# Patient Record
Sex: FEMALE | Race: AMERICAN INDIAN OR ALASKA NATIVE | HISPANIC OR LATINO | URBAN - METROPOLITAN AREA
[De-identification: names, ages, dates, MRNs, and addresses within clinical notes are randomized per-mention and may not be internally consistent; named-entity substitution may affect disease eponyms.]

---

## 2018-07-03 ENCOUNTER — APPOINTMENT (RX ONLY)
Dept: URBAN - METROPOLITAN AREA CLINIC 77 | Facility: CLINIC | Age: 28
Setting detail: DERMATOLOGY
End: 2018-07-03

## 2018-07-03 DIAGNOSIS — L70.0 ACNE VULGARIS: ICD-10-CM

## 2018-07-03 DIAGNOSIS — L71.8 OTHER ROSACEA: ICD-10-CM

## 2018-07-03 DIAGNOSIS — Z79.899 OTHER LONG TERM (CURRENT) DRUG THERAPY: ICD-10-CM

## 2018-07-03 PROCEDURE — ? PRESCRIPTION

## 2018-07-03 PROCEDURE — ? COUNSELING

## 2018-07-03 PROCEDURE — ? ORDER TESTS

## 2018-07-03 PROCEDURE — 99203 OFFICE O/P NEW LOW 30 MIN: CPT

## 2018-07-03 PROCEDURE — ? TREATMENT REGIMEN

## 2018-07-03 PROCEDURE — ? DIAGNOSIS COMMENT

## 2018-07-03 PROCEDURE — ? HIGH RISK MEDICATION MONITORING

## 2018-07-03 RX ORDER — OXYMETAZOLINE HYDROCHLORIDE 10 MG/G
CREAM TOPICAL
Qty: 1 | Refills: 3 | Status: ERX | COMMUNITY
Start: 2018-07-03

## 2018-07-03 RX ORDER — SULFACETAMIDE SODIUM, SULFUR 100; 20 MG/G; MG/G
EMULSION TOPICAL
Qty: 1 | Refills: 3 | Status: ERX | COMMUNITY
Start: 2018-07-03

## 2018-07-03 RX ORDER — IVERMECTIN 10 MG/G
CREAM TOPICAL
Qty: 1 | Refills: 4 | Status: ERX | COMMUNITY
Start: 2018-07-03

## 2018-07-03 RX ORDER — DOXYCYCLINE HYCLATE 120 MG/1
TABLET, DELAYED RELEASE ORAL
Qty: 60 | Refills: 2 | Status: ERX | COMMUNITY
Start: 2018-07-03

## 2018-07-03 RX ADMIN — IVERMECTIN: 10 CREAM TOPICAL at 18:57

## 2018-07-03 RX ADMIN — DOXYCYCLINE HYCLATE: 120 TABLET, DELAYED RELEASE ORAL at 19:03

## 2018-07-03 RX ADMIN — OXYMETAZOLINE HYDROCHLORIDE: 10 CREAM TOPICAL at 18:57

## 2018-07-03 RX ADMIN — SULFACETAMIDE SODIUM, SULFUR: 100; 20 EMULSION TOPICAL at 18:57

## 2018-07-03 NOTE — PROCEDURE: ORDER TESTS
Billing Type: Third-Party Bill
Bill For Surgical Tray: no
Lab Facility: [id number]
Performing Laboratory: [id number]
Expected Date Of Service: 07/03/2018

## 2018-08-03 ENCOUNTER — APPOINTMENT (RX ONLY)
Dept: URBAN - METROPOLITAN AREA CLINIC 77 | Facility: CLINIC | Age: 28
Setting detail: DERMATOLOGY
End: 2018-08-03

## 2018-08-03 DIAGNOSIS — L71.8 OTHER ROSACEA: ICD-10-CM

## 2018-08-03 DIAGNOSIS — L70.0 ACNE VULGARIS: ICD-10-CM

## 2018-08-03 DIAGNOSIS — Z79.899 OTHER LONG TERM (CURRENT) DRUG THERAPY: ICD-10-CM

## 2018-08-03 PROCEDURE — ? HIGH RISK MEDICATION MONITORING

## 2018-08-03 PROCEDURE — ? PRESCRIPTION

## 2018-08-03 PROCEDURE — ? DIAGNOSIS COMMENT

## 2018-08-03 PROCEDURE — ? ORDER TESTS

## 2018-08-03 PROCEDURE — ? TREATMENT REGIMEN

## 2018-08-03 PROCEDURE — 99213 OFFICE O/P EST LOW 20 MIN: CPT

## 2018-08-03 PROCEDURE — ? COUNSELING

## 2018-08-03 RX ORDER — DOXYCYCLINE HYCLATE 120 MG/1
TABLET, DELAYED RELEASE ORAL
Qty: 60 | Refills: 2 | Status: CANCELLED
Stop reason: CLARIF

## 2018-08-03 RX ORDER — SULFACETAMIDE SODIUM, SULFUR 100; 20 MG/G; MG/G
EMULSION TOPICAL
Qty: 1 | Refills: 3 | Status: CANCELLED
Stop reason: CLARIF

## 2018-08-03 RX ORDER — OXYMETAZOLINE HYDROCHLORIDE 1 G/100G
CREAM TOPICAL
Qty: 1 | Refills: 3 | Status: CANCELLED
Stop reason: CLARIF

## 2018-08-03 RX ORDER — IVERMECTIN 10 MG/G
CREAM TOPICAL
Qty: 1 | Refills: 4 | Status: CANCELLED
Stop reason: CLARIF

## 2018-08-03 NOTE — PROCEDURE: ORDER TESTS
Bill For Surgical Tray: no
Billing Type: Third-Party Bill
Expected Date Of Service: 07/03/2018
Lab Facility: [id number]
Performing Laboratory: [id number]

## 2018-08-03 NOTE — PROCEDURE: TREATMENT REGIMEN
Detail Level: Zone
Plan: Location: face\\nPrescribe: Doryx po qd x 30 days\\n                  Soolantra cream top qd x 30 days\\n                  AVAR LS Cleanser top (3-5 minutes) qd x 30 days\\n\\n\\nShe has improved greatly and is very happy (no longer thinking that she might need accutane at this point)\\nI agree and think that at this point we will continue the current regimen and have her try to go down on the Doryx dosing if possible\\n\\nDiscussed with her that it is an immune mediated condition that irritates the blood vessels and oil glands-----discussed that there will be ups and downs\\nEducated her on trigger factors such as stress, spicy foods, alcohol, sunlight, etc.\\nRecommend using sunscreen with zinc oxide to help prevent trigger factors from activating\\nWill prescribe Soolantra cream to help eliminate mites that are associated with rosacea.\\nWill also prescribe AVAR LS Cleanser (3-5 minutes) to help calm down inflammation.\\nWill start taking Doryx as needed. \\n\\nF/u in 3-4 months
Plan: Location: face\\nDuration: enrolling in iPledge\\nMethod of Contraception:\\nPrescribed:\\nPharmacy: [facility]\\niPledge:\\n\\nDiscussed with patient that this is an immune mediated condition triggered with stress, lack of sleep, and also has a major genetic component\\nEducated patient on Accutane 6 month treatment course, side effects, and iPledge program/requirements (2 negative pregnancy tests 30 days apart required)\\nCommon side effects from therapy: dryness, joint pain, mood changes, headaches, nose bleeds\\nDiscussed with patient that Accutane is a Vitamin A derivative\\nDiscussed with patient that Accutane obliterates oil glands and a cure for acne vs. antibiotics which is a temporary treatment\\nDiscussed with patient that medication is processed by the liver and requires blood work to monitor liver functions\\nPt discussed that she is tired of acne and wants it gone.\\nWill enroll pt into ipledge today, discussed with pt that she has to wait 30 days until we can send in prescription.\\nWill prescribe [enter medication] today to help relieve inflammation until f/u.\\nLab slips given.\\n\\nIPLEDGE program and consent form discussed with patient. The side effects and warnings for the use of Accutane were discussed with the patient. She understands she can not donate blood for 1 month post tx and can not have laser tx, cosmetic surgery, waxing or peels for 6 months post treatment.\\n\\nDiscussed musculoskeletal SE’s in detail. Discussed HA’s, dry skin, and depression in detail. Patient denies depression. Denies personal or family H/O Crohns, IBS, IBD, or bloody stools.\\n\\nUnderstand the need to fill Rx in 6 days and have monthly labs and OV.\\nF/u in 1 month

## 2018-11-05 ENCOUNTER — APPOINTMENT (RX ONLY)
Dept: URBAN - METROPOLITAN AREA CLINIC 77 | Facility: CLINIC | Age: 28
Setting detail: DERMATOLOGY
End: 2018-11-05

## 2018-11-05 DIAGNOSIS — L71.8 OTHER ROSACEA: ICD-10-CM

## 2018-11-05 PROCEDURE — 99213 OFFICE O/P EST LOW 20 MIN: CPT

## 2018-11-05 PROCEDURE — ? COUNSELING

## 2018-11-05 PROCEDURE — ? TREATMENT REGIMEN

## 2018-11-05 NOTE — PROCEDURE: TREATMENT REGIMEN
Detail Level: Zone
Plan: Location: face\\nPrescribe: Doryx po qd x 30 days\\n                  Soolantra cream top qd x 30 days\\n                  AVAR LS Cleanser top (3-5 minutes) qd x 30 days\\n\\n\\nShe has improved greatly and is very happy (no longer thinking that she might need accutane at this point)\\nI agree and think that at this point we will continue the current regimen and have her try to go down on the Doryx dosing if possible\\n\\nDiscussed with her that it is an immune mediated condition that irritates the blood vessels and oil glands-----discussed that there will be ups and downs\\nEducated her on trigger factors such as stress, spicy foods, alcohol, sunlight, etc.\\nRecommend using sunscreen with zinc oxide to help prevent trigger factors from activating\\nWill prescribe Soolantra cream to help eliminate mites that are associated with rosacea.\\nWill also prescribe AVAR LS Cleanser (3-5 minutes) to help calm down inflammation.\\nWill start taking Doryx as needed. \\n\\nF/u in 3-4 months

## 2020-04-07 ENCOUNTER — APPOINTMENT (RX ONLY)
Dept: URBAN - METROPOLITAN AREA CLINIC 77 | Facility: CLINIC | Age: 30
Setting detail: DERMATOLOGY
End: 2020-04-07

## 2020-04-07 DIAGNOSIS — L71.8 OTHER ROSACEA: ICD-10-CM

## 2020-04-07 PROCEDURE — ? PRESCRIPTION

## 2020-04-07 PROCEDURE — ? TREATMENT REGIMEN

## 2020-04-07 PROCEDURE — ? COUNSELING

## 2020-04-07 PROCEDURE — 99213 OFFICE O/P EST LOW 20 MIN: CPT

## 2020-04-07 RX ORDER — SULFACETAMIDE SODIUM, SULFUR 98; 48 MG/G; MG/G
LIQUID TOPICAL
Qty: 1 | Refills: 3 | Status: ERX | COMMUNITY
Start: 2020-04-07

## 2020-04-07 RX ORDER — OXYMETAZOLINE HYDROCHLORIDE 1 G/100G
CREAM TOPICAL
Qty: 1 | Refills: 3 | Status: ERX

## 2020-04-07 RX ADMIN — SULFACETAMIDE SODIUM, SULFUR: 98; 48 LIQUID TOPICAL at 00:00

## 2020-04-07 ASSESSMENT — LOCATION DETAILED DESCRIPTION DERM
LOCATION DETAILED: RIGHT INFERIOR CENTRAL MALAR CHEEK
LOCATION DETAILED: LEFT INFERIOR CENTRAL MALAR CHEEK

## 2020-04-07 ASSESSMENT — LOCATION SIMPLE DESCRIPTION DERM
LOCATION SIMPLE: RIGHT CHEEK
LOCATION SIMPLE: LEFT CHEEK

## 2020-04-07 ASSESSMENT — LOCATION ZONE DERM: LOCATION ZONE: FACE

## 2020-04-07 ASSESSMENT — SEVERITY ASSESSMENT OVERALL AMONG ALL PATIENTS
IN YOUR EXPERIENCE, AMONG ALL PATIENTS YOU HAVE SEEN WITH THIS CONDITION, HOW SEVERE IS THIS PATIENT'S CONDITION?: SEVERE

## 2020-04-07 NOTE — PROCEDURE: TREATMENT REGIMEN
Detail Level: Zone
Plan: Location: face\\nPrescribe: Doryx 200mg po qd x 30 days\\n                  Rhofade cream \\n                  Plexion  Cleanser top (3-5 minutes) qd x 30 days\\nDiscontinue: Soolantra 1% cream, Doryx 120mg \\nTELEDERM\\n\\nPatient is here for a follow up for her rosacea. \\nShe was last seen in 2018 and stated that her current regimen was effective and controlled her rosacea\\nShe found that Soolantra wasn’t beneficial and decided to discontinue it\\nShe is requesting a refill on Rhofade cream, Plexion cleanser, and Doryx oral antibiotic \\nDiscussed with her that there is visible redness and erythematous pustular papules on her face today \\Soha which I educated her that this is a combination of acne and rosacea\\nIf her current regimen is ineffective, then I advised to follow up so we may consider other options such as Accutane therapy \\nToday, will increase her Doryx from 120mg to 200mg, Plexion cleanser, and Rhofade cream\\nFollow up: 6-8 weeks

## 2020-06-22 ENCOUNTER — APPOINTMENT (RX ONLY)
Dept: URBAN - METROPOLITAN AREA CLINIC 77 | Facility: CLINIC | Age: 30
Setting detail: DERMATOLOGY
End: 2020-06-22

## 2020-06-22 DIAGNOSIS — L70.0 ACNE VULGARIS: ICD-10-CM

## 2020-06-22 DIAGNOSIS — L71.8 OTHER ROSACEA: ICD-10-CM

## 2020-06-22 DIAGNOSIS — Z79.899 OTHER LONG TERM (CURRENT) DRUG THERAPY: ICD-10-CM

## 2020-06-22 PROCEDURE — ? PRESCRIPTION

## 2020-06-22 PROCEDURE — ? URINE PREGNANCY TEST

## 2020-06-22 PROCEDURE — ? TREATMENT REGIMEN

## 2020-06-22 PROCEDURE — 99214 OFFICE O/P EST MOD 30 MIN: CPT

## 2020-06-22 PROCEDURE — ? HIGH RISK MEDICATION MONITORING

## 2020-06-22 PROCEDURE — ? ORDER TESTS

## 2020-06-22 PROCEDURE — 81025 URINE PREGNANCY TEST: CPT

## 2020-06-22 PROCEDURE — ? COUNSELING

## 2020-06-22 RX ORDER — OXYMETAZOLINE HYDROCHLORIDE 1 G/100G
CREAM TOPICAL
Qty: 1 | Refills: 3 | Status: ERX

## 2020-06-22 RX ORDER — SPIRONOLACTONE 100 MG/1
TABLET, FILM COATED ORAL QD
Qty: 30 | Refills: 1 | Status: ERX | COMMUNITY
Start: 2020-06-22

## 2020-06-22 RX ORDER — SULFACETAMIDE SODIUM, SULFUR 98; 48 MG/G; MG/G
LIQUID TOPICAL
Qty: 1 | Refills: 3 | Status: ERX

## 2020-06-22 RX ADMIN — SPIRONOLACTONE: 100 TABLET, FILM COATED ORAL at 00:00

## 2020-06-22 ASSESSMENT — LOCATION SIMPLE DESCRIPTION DERM
LOCATION SIMPLE: LEFT CHEEK
LOCATION SIMPLE: RIGHT CHEEK

## 2020-06-22 ASSESSMENT — LOCATION ZONE DERM: LOCATION ZONE: FACE

## 2020-06-22 ASSESSMENT — LOCATION DETAILED DESCRIPTION DERM
LOCATION DETAILED: LEFT INFERIOR CENTRAL MALAR CHEEK
LOCATION DETAILED: RIGHT INFERIOR CENTRAL MALAR CHEEK

## 2020-06-22 NOTE — PROCEDURE: COUNSELING
Detail Level: Detailed
Topical Retinoid counseling:  Patient advised to apply a pea-sized amount only at bedtime and wait 30 minutes after washing their face before applying.  If too drying, patient may add a non-comedogenic moisturizer. The patient verbalized understanding of the proper use and possible adverse effects of retinoids.  All of the patient's questions and concerns were addressed.
Erythromycin Pregnancy And Lactation Text: This medication is Pregnancy Category B and is considered safe during pregnancy. It is also excreted in breast milk.
Topical Sulfur Applications Counseling: Topical Sulfur Counseling: Patient counseled that this medication may cause skin irritation or allergic reactions.  In the event of skin irritation, the patient was advised to reduce the amount of the drug applied or use it less frequently.   The patient verbalized understanding of the proper use and possible adverse effects of topical sulfur application.  All of the patient's questions and concerns were addressed.
Spironolactone Counseling: Patient advised regarding risks of diarrhea, abdominal pain, hyperkalemia, birth defects (for female patients), liver toxicity and renal toxicity. The patient may need blood work to monitor liver and kidney function and potassium levels while on therapy. The patient verbalized understanding of the proper use and possible adverse effects of spironolactone.  All of the patient's questions and concerns were addressed.
Bactrim Pregnancy And Lactation Text: This medication is Pregnancy Category D and is known to cause fetal risk.  It is also excreted in breast milk.
Bactrim Counseling:  I discussed with the patient the risks of sulfa antibiotics including but not limited to GI upset, allergic reaction, drug rash, diarrhea, dizziness, photosensitivity, and yeast infections.  Rarely, more serious reactions can occur including but not limited to aplastic anemia, agranulocytosis, methemoglobinemia, blood dyscrasias, liver or kidney failure, lung infiltrates or desquamative/blistering drug rashes.
Isotretinoin Counseling: Patient should get monthly blood tests, not donate blood, not drive at night if vision affected, not share medication, and not undergo elective surgery for 6 months after tx completed. Side effects reviewed, pt to contact office should one occur.
Isotretinoin Pregnancy And Lactation Text: This medication is Pregnancy Category X and is considered extremely dangerous during pregnancy. It is unknown if it is excreted in breast milk.
Benzoyl Peroxide Pregnancy And Lactation Text: This medication is Pregnancy Category C. It is unknown if benzoyl peroxide is excreted in breast milk.
Birth Control Pills Counseling: Birth Control Pill Counseling: I discussed with the patient the potential side effects of OCPs including but not limited to increased risk of stroke, heart attack, thrombophlebitis, deep venous thrombosis, hepatic adenomas, breast changes, GI upset, headaches, and depression.  The patient verbalized understanding of the proper use and possible adverse effects of OCPs. All of the patient's questions and concerns were addressed.
Topical Clindamycin Counseling: Patient counseled that this medication may cause skin irritation or allergic reactions.  In the event of skin irritation, the patient was advised to reduce the amount of the drug applied or use it less frequently.   The patient verbalized understanding of the proper use and possible adverse effects of clindamycin.  All of the patient's questions and concerns were addressed.
Use Enhanced Medication Counseling?: No
Birth Control Pills Pregnancy And Lactation Text: This medication should be avoided if pregnant and for the first 30 days post-partum.
Azithromycin Counseling:  I discussed with the patient the risks of azithromycin including but not limited to GI upset, allergic reaction, drug rash, diarrhea, and yeast infections.
Dapsone Counseling: I discussed with the patient the risks of dapsone including but not limited to hemolytic anemia, agranulocytosis, rashes, methemoglobinemia, kidney failure, peripheral neuropathy, headaches, GI upset, and liver toxicity.  Patients who start dapsone require monitoring including baseline LFTs and weekly CBCs for the first month, then every month thereafter.  The patient verbalized understanding of the proper use and possible adverse effects of dapsone.  All of the patient's questions and concerns were addressed.
Erythromycin Counseling:  I discussed with the patient the risks of erythromycin including but not limited to GI upset, allergic reaction, drug rash, diarrhea, increase in liver enzymes, and yeast infections.
Tazorac Pregnancy And Lactation Text: This medication is not safe during pregnancy. It is unknown if this medication is excreted in breast milk.
Benzoyl Peroxide Counseling: Patient counseled that medicine may cause skin irritation and bleach clothing.  In the event of skin irritation, the patient was advised to reduce the amount of the drug applied or use it less frequently.   The patient verbalized understanding of the proper use and possible adverse effects of benzoyl peroxide.  All of the patient's questions and concerns were addressed.
Doxycycline Counseling:  Patient counseled regarding possible photosensitivity and increased risk for sunburn.  Patient instructed to avoid sunlight, if possible.  When exposed to sunlight, patients should wear protective clothing, sunglasses, and sunscreen.  The patient was instructed to call the office immediately if the following severe adverse effects occur:  hearing changes, easy bruising/bleeding, severe headache, or vision changes.  The patient verbalized understanding of the proper use and possible adverse effects of doxycycline.  All of the patient's questions and concerns were addressed.
High Dose Vitamin A Pregnancy And Lactation Text: High dose vitamin A therapy is contraindicated during pregnancy and breast feeding.
Topical Clindamycin Pregnancy And Lactation Text: This medication is Pregnancy Category B and is considered safe during pregnancy. It is unknown if it is excreted in breast milk.
Doxycycline Pregnancy And Lactation Text: This medication is Pregnancy Category D and not consider safe during pregnancy. It is also excreted in breast milk but is considered safe for shorter treatment courses.
Tetracycline Pregnancy And Lactation Text: This medication is Pregnancy Category D and not consider safe during pregnancy. It is also excreted in breast milk.
Minocycline Counseling: Patient advised regarding possible photosensitivity and discoloration of the teeth, skin, lips, tongue and gums.  Patient instructed to avoid sunlight, if possible.  When exposed to sunlight, patients should wear protective clothing, sunglasses, and sunscreen.  The patient was instructed to call the office immediately if the following severe adverse effects occur:  hearing changes, easy bruising/bleeding, severe headache, or vision changes.  The patient verbalized understanding of the proper use and possible adverse effects of minocycline.  All of the patient's questions and concerns were addressed.
High Dose Vitamin A Counseling: Side effects reviewed, pt to contact office should one occur.
Detail Level: Zone
Topical Sulfur Applications Pregnancy And Lactation Text: This medication is Pregnancy Category C and has an unknown safety profile during pregnancy. It is unknown if this topical medication is excreted in breast milk.
Dapsone Pregnancy And Lactation Text: This medication is Pregnancy Category C and is not considered safe during pregnancy or breast feeding.
Tazorac Counseling:  Patient advised that medication is irritating and drying.  Patient may need to apply sparingly and wash off after an hour before eventually leaving it on overnight.  The patient verbalized understanding of the proper use and possible adverse effects of tazorac.  All of the patient's questions and concerns were addressed.
Spironolactone Pregnancy And Lactation Text: This medication can cause feminization of the male fetus and should be avoided during pregnancy. The active metabolite is also found in breast milk.
Azithromycin Pregnancy And Lactation Text: This medication is considered safe during pregnancy and is also secreted in breast milk.
Topical Retinoid Pregnancy And Lactation Text: This medication is Pregnancy Category C. It is unknown if this medication is excreted in breast milk.
Tetracycline Counseling: Patient counseled regarding possible photosensitivity and increased risk for sunburn.  Patient instructed to avoid sunlight, if possible.  When exposed to sunlight, patients should wear protective clothing, sunglasses, and sunscreen.  The patient was instructed to call the office immediately if the following severe adverse effects occur:  hearing changes, easy bruising/bleeding, severe headache, or vision changes.  The patient verbalized understanding of the proper use and possible adverse effects of tetracycline.  All of the patient's questions and concerns were addressed. Patient understands to avoid pregnancy while on therapy due to potential birth defects.

## 2020-06-22 NOTE — PROCEDURE: HIGH RISK MEDICATION MONITORING
Infliximab Counseling:  I discussed with the patient the risks of infliximab including but not limited to myelosuppression, immunosuppression, autoimmune hepatitis, demyelinating diseases, lymphoma, and serious infections.  The patient understands that monitoring is required including a PPD at baseline and must alert us or the primary physician if symptoms of infection or other concerning signs are noted.
Siliq Counseling:  I discussed with the patient the risks of Siliq including but not limited to new or worsening depression, suicidal thoughts and behavior, immunosuppression, malignancy, posterior leukoencephalopathy syndrome, and serious infections.  The patient understands that monitoring is required including a PPD at baseline and must alert us or the primary physician if symptoms of infection or other concerning signs are noted. There is also a special program designed to monitor depression which is required with Siliq.
Ilumya Pregnancy And Lactation Text: The risk during pregnancy and breastfeeding is uncertain with this medication.
Arava Counseling:  Patient counseled regarding adverse effects of Arava including but not limited to nausea, vomiting, abnormalities in liver function tests. Patients may develop mouth sores, rash, diarrhea, and abnormalities in blood counts. The patient understands that monitoring is required including LFTs and blood counts.  There is a rare possibility of scarring of the liver and lung problems that can occur when taking methotrexate. Persistent nausea, loss of appetite, pale stools, dark urine, cough, and shortness of breath should be reported immediately. Patient advised to discontinue Arava treatment and consult with a physician prior to attempting conception. The patient will have to undergo a treatment to eliminate Arava from the body prior to conception.
Prednisone Pregnancy And Lactation Text: This medication is Pregnancy Category C and it isn't know if it is safe during pregnancy. This medication is excreted in breast milk.
Xolair Counseling:  Patient informed of potential adverse effects including but not limited to fever, muscle aches, rash and allergic reactions.  The patient verbalized understanding of the proper use and possible adverse effects of Xolair.  All of the patient's questions and concerns were addressed.
Enbrel Counseling:  I discussed with the patient the risks of etanercept including but not limited to myelosuppression, immunosuppression, autoimmune hepatitis, demyelinating diseases, lymphoma, and infections.  The patient understands that monitoring is required including a PPD at baseline and must alert us or the primary physician if symptoms of infection or other concerning signs are noted.
Cyclophosphamide Pregnancy And Lactation Text: This medication is Pregnancy Category D and it isn't considered safe during pregnancy. This medication is excreted in breast milk.
Elidel Counseling: Patient may experience a mild burning sensation during topical application. Elidel is not approved in children less than 2 years of age. There have been case reports of hematologic and skin malignancies in patients using topical calcineurin inhibitors although causality is questionable.
Detail Level: Generalized
Topical Retinoid Pregnancy And Lactation Text: This medication is Pregnancy Category C. It is unknown if this medication is excreted in breast milk.
Terbinafine Counseling: Patient counseling regarding adverse effects of terbinafine including but not limited to headache, diarrhea, rash, upset stomach, liver function test abnormalities, itching, taste/smell disturbance, nausea, abdominal pain, and flatulence.  There is a rare possibility of liver failure that can occur when taking terbinafine.  The patient understands that a baseline LFT and kidney function test may be required. The patient verbalized understanding of the proper use and possible adverse effects of terbinafine.  All of the patient's questions and concerns were addressed.
Bexarotene Counseling:  I discussed with the patient the risks of bexarotene including but not limited to hair loss, dry lips/skin/eyes, liver abnormalities, hyperlipidemia, pancreatitis, depression/suicidal ideation, photosensitivity, drug rash/allergic reactions, hypothyroidism, anemia, leukopenia, infection, cataracts, and teratogenicity.  Patient understands that they will need regular blood tests to check lipid profile, liver function tests, white blood cell count, thyroid function tests and pregnancy test if applicable.
Hydroxyzine Pregnancy And Lactation Text: This medication is not safe during pregnancy and should not be taken. It is also excreted in breast milk and breast feeding isn't recommended.
Cellcept Counseling:  I discussed with the patient the risks of mycophenolate mofetil including but not limited to infection/immunosuppression, GI upset, hypokalemia, hypercholesterolemia, bone marrow suppression, lymphoproliferative disorders, malignancy, GI ulceration/bleed/perforation, colitis, interstitial lung disease, kidney failure, progressive multifocal leukoencephalopathy, and birth defects.  The patient understands that monitoring is required including a baseline creatinine and regular CBC testing. In addition, patient must alert us immediately if symptoms of infection or other concerning signs are noted.
Cosentyx Counseling:  I discussed with the patient the risks of Cosentyx including but not limited to worsening of Crohn's disease, immunosuppression, allergic reactions and infections.  The patient understands that monitoring is required including a PPD at baseline and must alert us or the primary physician if symptoms of infection or other concerning signs are noted.
Hydroquinone Pregnancy And Lactation Text: This medication has not been assigned a Pregnancy Risk Category but animal studies failed to show danger with the topical medication. It is unknown if the medication is excreted in breast milk.
Metronidazole Counseling:  I discussed with the patient the risks of metronidazole including but not limited to seizures, nausea/vomiting, a metallic taste in the mouth, nausea/vomiting and severe allergy.
Albendazole Counseling:  I discussed with the patient the risks of albendazole including but not limited to cytopenia, kidney damage, nausea/vomiting and severe allergy.  The patient understands that this medication is being used in an off-label manner.
SSKI Counseling:  I discussed with the patient the risks of SSKI including but not limited to thyroid abnormalities, metallic taste, GI upset, fever, headache, acne, arthralgias, paraesthesias, lymphadenopathy, easy bleeding, arrhythmias, and allergic reaction.
Bactrim Pregnancy And Lactation Text: This medication is Pregnancy Category D and is known to cause fetal risk.  It is also excreted in breast milk.
Clindamycin Counseling: I counseled the patient regarding use of clindamycin as an antibiotic for prophylactic and/or therapeutic purposes. Clindamycin is active against numerous classes of bacteria, including skin bacteria. Side effects may include nausea, diarrhea, gastrointestinal upset, rash, hives, yeast infections, and in rare cases, colitis.
Quinolones Counseling:  I discussed with the patient the risks of fluoroquinolones including but not limited to GI upset, allergic reaction, drug rash, diarrhea, dizziness, photosensitivity, yeast infections, liver function test abnormalities, tendonitis/tendon rupture.
Use Enhanced Medication Counseling?: No
Birth Control Pills Counseling: Birth Control Pill Counseling: I discussed with the patient the potential side effects of OCPs including but not limited to increased risk of stroke, heart attack, thrombophlebitis, deep venous thrombosis, hepatic adenomas, breast changes, GI upset, headaches, and depression.  The patient verbalized understanding of the proper use and possible adverse effects of OCPs. All of the patient's questions and concerns were addressed.
Erythromycin Pregnancy And Lactation Text: This medication is Pregnancy Category B and is considered safe during pregnancy. It is also excreted in breast milk.
Hydroquinone Counseling:  Patient advised that medication may result in skin irritation, lightening (hypopigmentation), dryness, and burning.  In the event of skin irritation, the patient was advised to reduce the amount of the drug applied or use it less frequently.  Rarely, spots that are treated with hydroquinone can become darker (pseudoochronosis).  Should this occur, patient instructed to stop medication and call the office. The patient verbalized understanding of the proper use and possible adverse effects of hydroquinone.  All of the patient's questions and concerns were addressed.
Fluconazole Counseling:  Patient counseled regarding adverse effects of fluconazole including but not limited to headache, diarrhea, nausea, upset stomach, liver function test abnormalities, taste disturbance, and stomach pain.  There is a rare possibility of liver failure that can occur when taking fluconazole.  The patient understands that monitoring of LFTs and kidney function test may be required, especially at baseline. The patient verbalized understanding of the proper use and possible adverse effects of fluconazole.  All of the patient's questions and concerns were addressed.
Minoxidil Counseling: Minoxidil is a topical medication which can increase blood flow where it is applied. It is uncertain how this medication increases hair growth. Side effects are uncommon and include stinging and allergic reactions.
Infliximab Pregnancy And Lactation Text: This medication is Pregnancy Category B and is considered safe during pregnancy. It is unknown if this medication is excreted in breast milk.
Valtrex Pregnancy And Lactation Text: this medication is Pregnancy Category B and is considered safe during pregnancy. This medication is not directly found in breast milk but it's metabolite acyclovir is present.
Acitretin Pregnancy And Lactation Text: This medication is Pregnancy Category X and should not be given to women who are pregnant or may become pregnant in the future. This medication is excreted in breast milk.
Sski Pregnancy And Lactation Text: This medication is Pregnancy Category D and isn't considered safe during pregnancy. It is excreted in breast milk.
Isotretinoin Pregnancy And Lactation Text: This medication is Pregnancy Category X and is considered extremely dangerous during pregnancy. It is unknown if it is excreted in breast milk.
Odomzo Counseling- I discussed with the patient the risks of Odomzo including but not limited to nausea, vomiting, diarrhea, constipation, weight loss, changes in the sense of taste, decreased appetite, muscle spasms, and hair loss.  The patient verbalized understanding of the proper use and possible adverse effects of Odomzo.  All of the patient's questions and concerns were addressed.
Drysol Counseling:  I discussed with the patient the risks of drysol/aluminum chloride including but not limited to skin rash, itching, irritation, burning.
Oxybutynin Counseling:  I discussed with the patient the risks of oxybutynin including but not limited to skin rash, drowsiness, dry mouth, difficulty urinating, and blurred vision.
Drysol Pregnancy And Lactation Text: This medication is considered safe during pregnancy and breast feeding.
Cephalexin Pregnancy And Lactation Text: This medication is Pregnancy Category B and considered safe during pregnancy.  It is also excreted in breast milk but can be used safely for shorter doses.
Fluconazole Pregnancy And Lactation Text: This medication is Pregnancy Category C and it isn't know if it is safe during pregnancy. It is also excreted in breast milk.
5-Fu Pregnancy And Lactation Text: This medication is Pregnancy Category X and contraindicated in pregnancy and in women who may become pregnant. It is unknown if this medication is excreted in breast milk.
Spironolactone Counseling: Patient advised regarding risks of diarrhea, abdominal pain, hyperkalemia, birth defects (for female patients), liver toxicity and renal toxicity. The patient may need blood work to monitor liver and kidney function and potassium levels while on therapy. The patient verbalized understanding of the proper use and possible adverse effects of spironolactone.  All of the patient's questions and concerns were addressed.
Otezla Counseling: The side effects of Otezla were discussed with the patient, including but not limited to worsening or new depression, weight loss, diarrhea, nausea, upper respiratory tract infection, and headache. Patient instructed to call the office should any adverse effect occur.  The patient verbalized understanding of the proper use and possible adverse effects of Otezla.  All the patient's questions and concerns were addressed.
Cimzia Pregnancy And Lactation Text: This medication crosses the placenta but can be considered safe in certain situations. Cimzia may be excreted in breast milk.
Gabapentin Counseling: I discussed with the patient the risks of gabapentin including but not limited to dizziness, somnolence, fatigue and ataxia.
Solaraze Pregnancy And Lactation Text: This medication is Pregnancy Category B and is considered safe. There is some data to suggest avoiding during the third trimester. It is unknown if this medication is excreted in breast milk.
Isotretinoin Counseling: Patient should get monthly blood tests, not donate blood, not drive at night if vision affected, not share medication, and not undergo elective surgery for 6 months after tx completed. Side effects reviewed, pt to contact office should one occur.
Cephalexin Counseling: I counseled the patient regarding use of cephalexin as an antibiotic for prophylactic and/or therapeutic purposes. Cephalexin (commonly prescribed under brand name Keflex) is a cephalosporin antibiotic which is active against numerous classes of bacteria, including most skin bacteria. Side effects may include nausea, diarrhea, gastrointestinal upset, rash, hives, yeast infections, and in rare cases, hepatitis, kidney disease, seizures, fever, confusion, neurologic symptoms, and others. Patients with severe allergies to penicillin medications are cautioned that there is about a 10% incidence of cross-reactivity with cephalosporins. When possible, patients with penicillin allergies should use alternatives to cephalosporins for antibiotic therapy.
Dupixent Pregnancy And Lactation Text: This medication likely crosses the placenta but the risk for the fetus is uncertain. This medication is excreted in breast milk.
Griseofulvin Counseling:  I discussed with the patient the risks of griseofulvin including but not limited to photosensitivity, cytopenia, liver damage, nausea/vomiting and severe allergy.  The patient understands that this medication is best absorbed when taken with a fatty meal (e.g., ice cream or french fries).
Methotrexate Pregnancy And Lactation Text: This medication is Pregnancy Category X and is known to cause fetal harm. This medication is excreted in breast milk.
Prednisone Counseling:  I discussed with the patient the risks of prolonged use of prednisone including but not limited to weight gain, insomnia, osteoporosis, mood changes, diabetes, susceptibility to infection, glaucoma and high blood pressure.  In cases where prednisone use is prolonged, patients should be monitored with blood pressure checks, serum glucose levels and an eye exam.  Additionally, the patient may need to be placed on GI prophylaxis, PCP prophylaxis, and calcium and vitamin D supplementation and/or a bisphosphonate.  The patient verbalized understanding of the proper use and the possible adverse effects of prednisone.  All of the patient's questions and concerns were addressed.
Itraconazole Counseling:  I discussed with the patient the risks of itraconazole including but not limited to liver damage, nausea/vomiting, neuropathy, and severe allergy.  The patient understands that this medication is best absorbed when taken with acidic beverages such as non-diet cola or ginger ale.  The patient understands that monitoring is required including baseline LFTs and repeat LFTs at intervals.  The patient understands that they are to contact us or the primary physician if concerning signs are noted.
Xolair Pregnancy And Lactation Text: This medication is Pregnancy Category B and is considered safe during pregnancy. This medication is excreted in breast milk.
Xeljanz Counseling: I discussed with the patient the risks of Xeljanz therapy including increased risk of infection, liver issues, headache, diarrhea, or cold symptoms. Live vaccines should be avoided. They were instructed to call if they have any problems.
Clofazimine Counseling:  I discussed with the patient the risks of clofazimine including but not limited to skin and eye pigmentation, liver damage, nausea/vomiting, gastrointestinal bleeding and allergy.
Erythromycin Counseling:  I discussed with the patient the risks of erythromycin including but not limited to GI upset, allergic reaction, drug rash, diarrhea, increase in liver enzymes, and yeast infections.
Dapsone Pregnancy And Lactation Text: This medication is Pregnancy Category C and is not considered safe during pregnancy or breast feeding.
Dupixent Counseling: I discussed with the patient the risks of dupilumab including but not limited to eye infection and irritation, cold sores, injection site reactions, worsening of asthma, allergic reactions and increased risk of parasitic infection.  Live vaccines should be avoided while taking dupilumab. Dupilumab will also interact with certain medications such as warfarin and cyclosporine. The patient understands that monitoring is required and they must alert us or the primary physician if symptoms of infection or other concerning signs are noted.
Protopic Counseling: Patient may experience a mild burning sensation during topical application. Protopic is not approved in children less than 2 years of age. There have been case reports of hematologic and skin malignancies in patients using topical calcineurin inhibitors although causality is questionable.
Colchicine Counseling:  Patient counseled regarding adverse effects including but not limited to stomach upset (nausea, vomiting, stomach pain, or diarrhea).  Patient instructed to limit alcohol consumption while taking this medication.  Colchicine may reduce blood counts especially with prolonged use.  The patient understands that monitoring of kidney function and blood counts may be required, especially at baseline. The patient verbalized understanding of the proper use and possible adverse effects of colchicine.  All of the patient's questions and concerns were addressed.
Imiquimod Counseling:  I discussed with the patient the risks of imiquimod including but not limited to erythema, scaling, itching, weeping, crusting, and pain.  Patient understands that the inflammatory response to imiquimod is variable from person to person and was educated regarded proper titration schedule.  If flu-like symptoms develop, patient knows to discontinue the medication and contact us.
Thalidomide Counseling: I discussed with the patient the risks of thalidomide including but not limited to birth defects, anxiety, weakness, chest pain, dizziness, cough and severe allergy.
Nsaids Counseling: NSAID Counseling: I discussed with the patient that NSAIDs should be taken with food. Prolonged use of NSAIDs can result in the development of stomach ulcers.  Patient advised to stop taking NSAIDs if abdominal pain occurs.  The patient verbalized understanding of the proper use and possible adverse effects of NSAIDs.  All of the patient's questions and concerns were addressed.
Minocycline Pregnancy And Lactation Text: This medication is Pregnancy Category D and not consider safe during pregnancy. It is also excreted in breast milk.
Terbinafine Pregnancy And Lactation Text: This medication is Pregnancy Category B and is considered safe during pregnancy. It is also excreted in breast milk and breast feeding isn't recommended.
Cyclophosphamide Counseling:  I discussed with the patient the risks of cyclophosphamide including but not limited to hair loss, hormonal abnormalities, decreased fertility, abdominal pain, diarrhea, nausea and vomiting, bone marrow suppression and infection. The patient understands that monitoring is required while taking this medication.
Ivermectin Pregnancy And Lactation Text: This medication is Pregnancy Category C and it isn't known if it is safe during pregnancy. It is also excreted in breast milk.
Tremfya Counseling: I discussed with the patient the risks of guselkumab including but not limited to immunosuppression, serious infections, worsening of inflammatory bowel disease and drug reactions.  The patient understands that monitoring is required including a PPD at baseline and must alert us or the primary physician if symptoms of infection or other concerning signs are noted.
Spironolactone Pregnancy And Lactation Text: This medication can cause feminization of the male fetus and should be avoided during pregnancy. The active metabolite is also found in breast milk.
Tetracycline Counseling: Patient counseled regarding possible photosensitivity and increased risk for sunburn.  Patient instructed to avoid sunlight, if possible.  When exposed to sunlight, patients should wear protective clothing, sunglasses, and sunscreen.  The patient was instructed to call the office immediately if the following severe adverse effects occur:  hearing changes, easy bruising/bleeding, severe headache, or vision changes.  The patient verbalized understanding of the proper use and possible adverse effects of tetracycline.  All of the patient's questions and concerns were addressed. Patient understands to avoid pregnancy while on therapy due to potential birth defects.
Doxepin Pregnancy And Lactation Text: This medication is Pregnancy Category C and it isn't known if it is safe during pregnancy. It is also excreted in breast milk and breast feeding isn't recommended.
Clofazimine Pregnancy And Lactation Text: This medication is Pregnancy Category C and isn't considered safe during pregnancy. It is excreted in breast milk.
Doxepin Counseling:  Patient advised that the medication is sedating and not to drive a car after taking this medication. Patient informed of potential adverse effects including but not limited to dry mouth, urinary retention, and blurry vision.  The patient verbalized understanding of the proper use and possible adverse effects of doxepin.  All of the patient's questions and concerns were addressed.
High Dose Vitamin A Pregnancy And Lactation Text: High dose vitamin A therapy is contraindicated during pregnancy and breast feeding.
Methotrexate Counseling:  Patient counseled regarding adverse effects of methotrexate including but not limited to nausea, vomiting, abnormalities in liver function tests. Patients may develop mouth sores, rash, diarrhea, and abnormalities in blood counts. The patient understands that monitoring is required including LFT's and blood counts.  There is a rare possibility of scarring of the liver and lung problems that can occur when taking methotrexate. Persistent nausea, loss of appetite, pale stools, dark urine, cough, and shortness of breath should be reported immediately. Patient advised to discontinue methotrexate treatment at least three months before attempting to become pregnant.  I discussed the need for folate supplements while taking methotrexate.  These supplements can decrease side effects during methotrexate treatment. The patient verbalized understanding of the proper use and possible adverse effects of methotrexate.  All of the patient's questions and concerns were addressed.
Benzoyl Peroxide Counseling: Patient counseled that medicine may cause skin irritation and bleach clothing.  In the event of skin irritation, the patient was advised to reduce the amount of the drug applied or use it less frequently.   The patient verbalized understanding of the proper use and possible adverse effects of benzoyl peroxide.  All of the patient's questions and concerns were addressed.
Topical Clindamycin Pregnancy And Lactation Text: This medication is Pregnancy Category B and is considered safe during pregnancy. It is unknown if it is excreted in breast milk.
Cellcept Pregnancy And Lactation Text: This medication is Pregnancy Category D and isn't considered safe during pregnancy. It is unknown if this medication is excreted in breast milk.
Rifampin Pregnancy And Lactation Text: This medication is Pregnancy Category C and it isn't know if it is safe during pregnancy. It is also excreted in breast milk and should not be used if you are breast feeding.
Erivedge Pregnancy And Lactation Text: This medication is Pregnancy Category X and is absolutely contraindicated during pregnancy. It is unknown if it is excreted in breast milk.
Bactrim Counseling:  I discussed with the patient the risks of sulfa antibiotics including but not limited to GI upset, allergic reaction, drug rash, diarrhea, dizziness, photosensitivity, and yeast infections.  Rarely, more serious reactions can occur including but not limited to aplastic anemia, agranulocytosis, methemoglobinemia, blood dyscrasias, liver or kidney failure, lung infiltrates or desquamative/blistering drug rashes.
Ivermectin Counseling:  Patient instructed to take medication on an empty stomach with a full glass of water.  Patient informed of potential adverse effects including but not limited to nausea, diarrhea, dizziness, itching, and swelling of the extremities or lymph nodes.  The patient verbalized understanding of the proper use and possible adverse effects of ivermectin.  All of the patient's questions and concerns were addressed.
Valtrex Counseling: I discussed with the patient the risks of valacyclovir including but not limited to kidney damage, nausea, vomiting and severe allergy.  The patient understands that if the infection seems to be worsening or is not improving, they are to call.
Eucrisa Counseling: Patient may experience a mild burning sensation during topical application. Eucrisa is not approved in children less than 2 years of age.
Carac Counseling:  I discussed with the patient the risks of Carac including but not limited to erythema, scaling, itching, weeping, crusting, and pain.
Picato Counseling:  I discussed with the patient the risks of Picato including but not limited to erythema, scaling, itching, weeping, crusting, and pain.
Glycopyrrolate Counseling:  I discussed with the patient the risks of glycopyrrolate including but not limited to skin rash, drowsiness, dry mouth, difficulty urinating, and blurred vision.
High Dose Vitamin A Counseling: Side effects reviewed, pt to contact office should one occur.
Taltz Counseling: I discussed with the patient the risks of ixekizumab including but not limited to immunosuppression, serious infections, worsening of inflammatory bowel disease and drug reactions.  The patient understands that monitoring is required including a PPD at baseline and must alert us or the primary physician if symptoms of infection or other concerning signs are noted.
Ilumya Counseling: I discussed with the patient the risks of tildrakizumab including but not limited to immunosuppression, malignancy, posterior leukoencephalopathy syndrome, and serious infections.  The patient understands that monitoring is required including a PPD at baseline and must alert us or the primary physician if symptoms of infection or other concerning signs are noted.
Humira Counseling:  I discussed with the patient the risks of adalimumab including but not limited to myelosuppression, immunosuppression, autoimmune hepatitis, demyelinating diseases, lymphoma, and serious infections.  The patient understands that monitoring is required including a PPD at baseline and must alert us or the primary physician if symptoms of infection or other concerning signs are noted.
Benzoyl Peroxide Pregnancy And Lactation Text: This medication is Pregnancy Category C. It is unknown if benzoyl peroxide is excreted in breast milk.
Rifampin Counseling: I discussed with the patient the risks of rifampin including but not limited to liver damage, kidney damage, red-orange body fluids, nausea/vomiting and severe allergy.
Protopic Pregnancy And Lactation Text: This medication is Pregnancy Category C. It is unknown if this medication is excreted in breast milk when applied topically.
Ketoconazole Pregnancy And Lactation Text: This medication is Pregnancy Category C and it isn't know if it is safe during pregnancy. It is also excreted in breast milk and breast feeding isn't recommended.
Otezla Pregnancy And Lactation Text: This medication is Pregnancy Category C and it isn't known if it is safe during pregnancy. It is unknown if it is excreted in breast milk.
Topical Sulfur Applications Counseling: Topical Sulfur Counseling: Patient counseled that this medication may cause skin irritation or allergic reactions.  In the event of skin irritation, the patient was advised to reduce the amount of the drug applied or use it less frequently.   The patient verbalized understanding of the proper use and possible adverse effects of topical sulfur application.  All of the patient's questions and concerns were addressed.
Tazorac Pregnancy And Lactation Text: This medication is not safe during pregnancy. It is unknown if this medication is excreted in breast milk.
5-Fu Counseling: 5-Fluorouracil Counseling:  I discussed with the patient the risks of 5-fluorouracil including but not limited to erythema, scaling, itching, weeping, crusting, and pain.
Solaraze Counseling:  I discussed with the patient the risks of Solaraze including but not limited to erythema, scaling, itching, weeping, crusting, and pain.
Griseofulvin Pregnancy And Lactation Text: This medication is Pregnancy Category X and is known to cause serious birth defects. It is unknown if this medication is excreted in breast milk but breast feeding should be avoided.
Simponi Counseling:  I discussed with the patient the risks of golimumab including but not limited to myelosuppression, immunosuppression, autoimmune hepatitis, demyelinating diseases, lymphoma, and serious infections.  The patient understands that monitoring is required including a PPD at baseline and must alert us or the primary physician if symptoms of infection or other concerning signs are noted.
Zyclara Counseling:  I discussed with the patient the risks of imiquimod including but not limited to erythema, scaling, itching, weeping, crusting, and pain.  Patient understands that the inflammatory response to imiquimod is variable from person to person and was educated regarded proper titration schedule.  If flu-like symptoms develop, patient knows to discontinue the medication and contact us.
Cimetidine Counseling:  I discussed with the patient the risks of Cimetidine including but not limited to gynecomastia, headache, diarrhea, nausea, drowsiness, arrhythmias, pancreatitis, skin rashes, psychosis, bone marrow suppression and kidney toxicity.
Metronidazole Pregnancy And Lactation Text: This medication is Pregnancy Category B and considered safe during pregnancy.  It is also excreted in breast milk.
Doxycycline Counseling:  Patient counseled regarding possible photosensitivity and increased risk for sunburn.  Patient instructed to avoid sunlight, if possible.  When exposed to sunlight, patients should wear protective clothing, sunglasses, and sunscreen.  The patient was instructed to call the office immediately if the following severe adverse effects occur:  hearing changes, easy bruising/bleeding, severe headache, or vision changes.  The patient verbalized understanding of the proper use and possible adverse effects of doxycycline.  All of the patient's questions and concerns were addressed.
Hydroxychloroquine Pregnancy And Lactation Text: This medication has been shown to cause fetal harm but it isn't assigned a Pregnancy Risk Category. There are small amounts excreted in breast milk.
Topical Retinoid counseling:  Patient advised to apply a pea-sized amount only at bedtime and wait 30 minutes after washing their face before applying.  If too drying, patient may add a non-comedogenic moisturizer. The patient verbalized understanding of the proper use and possible adverse effects of retinoids.  All of the patient's questions and concerns were addressed.
Rituxan Counseling:  I discussed with the patient the risks of Rituxan infusions. Side effects can include infusion reactions, severe drug rashes including mucocutaneous reactions, reactivation of latent hepatitis and other infections and rarely progressive multifocal leukoencephalopathy.  All of the patient's questions and concerns were addressed.
Azathioprine Counseling:  I discussed with the patient the risks of azathioprine including but not limited to myelosuppression, immunosuppression, hepatotoxicity, lymphoma, and infections.  The patient understands that monitoring is required including baseline LFTs, Creatinine, possible TPMP genotyping and weekly CBCs for the first month and then every 2 weeks thereafter.  The patient verbalized understanding of the proper use and possible adverse effects of azathioprine.  All of the patient's questions and concerns were addressed.
Erivedge Counseling- I discussed with the patient the risks of Erivedge including but not limited to nausea, vomiting, diarrhea, constipation, weight loss, changes in the sense of taste, decreased appetite, muscle spasms, and hair loss.  The patient verbalized understanding of the proper use and possible adverse effects of Erivedge.  All of the patient's questions and concerns were addressed.
Minocycline Counseling: Patient advised regarding possible photosensitivity and discoloration of the teeth, skin, lips, tongue and gums.  Patient instructed to avoid sunlight, if possible.  When exposed to sunlight, patients should wear protective clothing, sunglasses, and sunscreen.  The patient was instructed to call the office immediately if the following severe adverse effects occur:  hearing changes, easy bruising/bleeding, severe headache, or vision changes.  The patient verbalized understanding of the proper use and possible adverse effects of minocycline.  All of the patient's questions and concerns were addressed.
Glycopyrrolate Pregnancy And Lactation Text: This medication is Pregnancy Category B and is considered safe during pregnancy. It is unknown if it is excreted breast milk.
Ketoconazole Counseling:   Patient counseled regarding improving absorption with orange juice.  Adverse effects include but are not limited to breast enlargement, headache, diarrhea, nausea, upset stomach, liver function test abnormalities, taste disturbance, and stomach pain.  There is a rare possibility of liver failure that can occur when taking ketoconazole. The patient understands that monitoring of LFTs may be required, especially at baseline. The patient verbalized understanding of the proper use and possible adverse effects of ketoconazole.  All of the patient's questions and concerns were addressed.
Rituxan Pregnancy And Lactation Text: This medication is Pregnancy Category C and it isn't know if it is safe during pregnancy. It is unknown if this medication is excreted in breast milk but similar antibodies are known to be excreted.
Azithromycin Pregnancy And Lactation Text: This medication is considered safe during pregnancy and is also secreted in breast milk.
Clindamycin Pregnancy And Lactation Text: This medication can be used in pregnancy if certain situations. Clindamycin is also present in breast milk.
Xelrussellz Pregnancy And Lactation Text: This medication is Pregnancy Category D and is not considered safe during pregnancy.  The risk during breast feeding is also uncertain.
Topical Clindamycin Counseling: Patient counseled that this medication may cause skin irritation or allergic reactions.  In the event of skin irritation, the patient was advised to reduce the amount of the drug applied or use it less frequently.   The patient verbalized understanding of the proper use and possible adverse effects of clindamycin.  All of the patient's questions and concerns were addressed.
Nsaids Pregnancy And Lactation Text: These medications are considered safe up to 30 weeks gestation. It is excreted in breast milk.
Acitretin Counseling:  I discussed with the patient the risks of acitretin including but not limited to hair loss, dry lips/skin/eyes, liver damage, hyperlipidemia, depression/suicidal ideation, photosensitivity.  Serious rare side effects can include but are not limited to pancreatitis, pseudotumor cerebri, bony changes, clot formation/stroke/heart attack.  Patient understands that alcohol is contraindicated since it can result in liver toxicity and significantly prolong the elimination of the drug by many years.
Stelara Counseling:  I discussed with the patient the risks of ustekinumab including but not limited to immunosuppression, malignancy, posterior leukoencephalopathy syndrome, and serious infections.  The patient understands that monitoring is required including a PPD at baseline and must alert us or the primary physician if symptoms of infection or other concerning signs are noted.
Bexarotene Pregnancy And Lactation Text: This medication is Pregnancy Category X and should not be given to women who are pregnant or may become pregnant. This medication should not be used if you are breast feeding.
Cimzia Counseling:  I discussed with the patient the risks of Cimzia including but not limited to immunosuppression, allergic reactions and infections.  The patient understands that monitoring is required including a PPD at baseline and must alert us or the primary physician if symptoms of infection or other concerning signs are noted.
Doxycycline Pregnancy And Lactation Text: This medication is Pregnancy Category D and not consider safe during pregnancy. It is also excreted in breast milk but is considered safe for shorter treatment courses.
Hydroxychloroquine Counseling:  I discussed with the patient that a baseline ophthalmologic exam is needed at the start of therapy and every year thereafter while on therapy. A CBC may also be warranted for monitoring.  The side effects of this medication were discussed with the patient, including but not limited to agranulocytosis, aplastic anemia, seizures, rashes, retinopathy, and liver toxicity. Patient instructed to call the office should any adverse effect occur.  The patient verbalized understanding of the proper use and possible adverse effects of Plaquenil.  All the patient's questions and concerns were addressed.
Topical Sulfur Applications Pregnancy And Lactation Text: This medication is Pregnancy Category C and has an unknown safety profile during pregnancy. It is unknown if this topical medication is excreted in breast milk.
Cyclosporine Counseling:  I discussed with the patient the risks of cyclosporine including but not limited to hypertension, gingival hyperplasia,myelosuppression, immunosuppression, liver damage, kidney damage, neurotoxicity, lymphoma, and serious infections. The patient understands that monitoring is required including baseline blood pressure, CBC, CMP, lipid panel and uric acid, and then 1-2 times monthly CMP and blood pressure.
Dapsone Counseling: I discussed with the patient the risks of dapsone including but not limited to hemolytic anemia, agranulocytosis, rashes, methemoglobinemia, kidney failure, peripheral neuropathy, headaches, GI upset, and liver toxicity.  Patients who start dapsone require monitoring including baseline LFTs and weekly CBCs for the first month, then every month thereafter.  The patient verbalized understanding of the proper use and possible adverse effects of dapsone.  All of the patient's questions and concerns were addressed.
Birth Control Pills Pregnancy And Lactation Text: This medication should be avoided if pregnant and for the first 30 days post-partum.
Tazorac Counseling:  Patient advised that medication is irritating and drying.  Patient may need to apply sparingly and wash off after an hour before eventually leaving it on overnight.  The patient verbalized understanding of the proper use and possible adverse effects of tazorac.  All of the patient's questions and concerns were addressed.
Azithromycin Counseling:  I discussed with the patient the risks of azithromycin including but not limited to GI upset, allergic reaction, drug rash, diarrhea, and yeast infections.
Hydroxyzine Counseling: Patient advised that the medication is sedating and not to drive a car after taking this medication.  Patient informed of potential adverse effects including but not limited to dry mouth, urinary retention, and blurry vision.  The patient verbalized understanding of the proper use and possible adverse effects of hydroxyzine.  All of the patient's questions and concerns were addressed.

## 2020-06-22 NOTE — PROCEDURE: TREATMENT REGIMEN
Detail Level: Zone
Plan: Location: face\\nPrescribe: Doryx 200mg po qd x 30 days\\n                  Rhofade cream \\n                  Plexion  Cleanser top (3-5 minutes) qd x 30 days\\nDiscontinue: Soolantra 1% cream, Doryx 120mg \\n\\n\\n06/22/2020\\n\\nPatient is here for a follow up on rosacea, she has been taking oral Doryx 200mg daily and applying Rhofade topical cream in the mornings\\nToday I will continue her on current regimen and have her follow up as instructed\\nWill send out refills on all medication's at today's visit\\nPatient is to follow up in 2 months \\n\\n\\n------------------------------------------------------------------\\nTELEDERM\\n\\nPatient is here for a follow up for her rosacea. \\nShe was last seen in 2018 and stated that her current regimen was effective and controlled her rosacea\\nShe found that Soolantra wasn’t beneficial and decided to discontinue it\\nShe is requesting a refill on Rhofade cream, Plexion cleanser, and Doryx oral antibiotic \\nDiscussed with her that there is visible redness and erythematous pustular papules on her face today \\Soha which I educated her that this is a combination of acne and rosacea\\nIf her current regimen is ineffective, then I advised to follow up so we may consider other options such as Accutane therapy \\nToday, will increase her Doryx from 120mg to 200mg, Plexion cleanser, and Rhofade cream\\nFollow up: 6-8 weeks
Plan: Location: face\\nDuration: enrolling in iPledge\\nMethod of Contraception: OBC & MLC \\nPrescribed: Spironolactone 100 mg tablet (Take 1 tablets daily with food.)\\nPharmacy: [facility]\\niPledge: [phone]\\n\\nFAILED: SPIRONOLACTONE 100mg, Plexion topical cleanser \\n\\nDiscussed with patient that this is an immune mediated condition triggered with stress, lack of sleep, and also has a major genetic component\\nEducated patient on Accutane 6 month treatment course, side effects, and iPledge program/requirements (2 negative pregnancy tests 30 days apart required)\\nCommon side effects from therapy: dryness, joint pain, mood changes, headaches, nose bleeds\\nDiscussed with patient that Accutane is a Vitamin A derivative\\nDiscussed with patient that Accutane obliterates oil glands and a cure for acne vs. antibiotics which is a temporary treatment\\nDiscussed with patient that medication is processed by the liver and requires blood work to monitor liver functions\\nPt discussed that she is tired of acne and wants it gone.\\nWill enroll pt into ipledge today, discussed with pt that she has to wait 30 days until we can send in prescription.\\nWill prescribe [enter medication] today to help relieve inflammation until f/u.\\nLab slips given.\\n\\nIPLEDGE program and consent form discussed with patient. The side effects and warnings for the use of Accutane were discussed with the patient. She understands she can not donate blood for 1 month post tx and can not have laser tx, cosmetic surgery, waxing or peels for 6 months post treatment.\\n\\nDiscussed musculoskeletal SE’s in detail. Discussed HA’s, dry skin, and depression in detail. Patient denies depression. Denies personal or family H/O Crohns, IBS, IBD, or bloody stools.\\n\\nUnderstand the need to fill Rx in 6 days and have monthly labs and OV.\\n\\n\\nPatient is to follow up in one month

## 2020-06-22 NOTE — PROCEDURE: ORDER TESTS
Bill For Surgical Tray: no
Billing Type: Third-Party Bill
Expected Date Of Service: 06/22/2020
Lab Facility: [id number]
Performing Laboratory: [id number]

## 2020-06-22 NOTE — PROCEDURE: URINE PREGNANCY TEST
Detail Level: None
Urine Pregnancy Test Result: negative
Expiration Date (Optional): 2022-01-31
Lot # (Optional): [id number]
Performed By: Kirsten Henderson

## 2020-07-24 ENCOUNTER — APPOINTMENT (RX ONLY)
Dept: URBAN - METROPOLITAN AREA CLINIC 77 | Facility: CLINIC | Age: 30
Setting detail: DERMATOLOGY
End: 2020-07-24

## 2020-07-24 DIAGNOSIS — L70.0 ACNE VULGARIS: ICD-10-CM

## 2020-07-24 DIAGNOSIS — L85.3 XEROSIS CUTIS: ICD-10-CM

## 2020-07-24 DIAGNOSIS — Z79.899 OTHER LONG TERM (CURRENT) DRUG THERAPY: ICD-10-CM

## 2020-07-24 PROCEDURE — ? ORDER TESTS

## 2020-07-24 PROCEDURE — ? HIGH RISK MEDICATION MONITORING

## 2020-07-24 PROCEDURE — ? PRESCRIPTION

## 2020-07-24 PROCEDURE — 99214 OFFICE O/P EST MOD 30 MIN: CPT

## 2020-07-24 PROCEDURE — ? COUNSELING

## 2020-07-24 PROCEDURE — ? TREATMENT REGIMEN

## 2020-07-24 RX ORDER — ISOTRETINOIN 40 MG/1
CAPSULE ORAL
Qty: 30 | Refills: 0 | Status: ERX | COMMUNITY
Start: 2020-07-24

## 2020-07-24 RX ADMIN — ISOTRETINOIN: 40 CAPSULE ORAL at 00:00

## 2020-07-24 NOTE — PROCEDURE: COUNSELING
Topical Retinoid counseling:  Patient advised to apply a pea-sized amount only at bedtime and wait 30 minutes after washing their face before applying.  If too drying, patient may add a non-comedogenic moisturizer. The patient verbalized understanding of the proper use and possible adverse effects of retinoids.  All of the patient's questions and concerns were addressed.
Erythromycin Pregnancy And Lactation Text: This medication is Pregnancy Category B and is considered safe during pregnancy. It is also excreted in breast milk.
Topical Sulfur Applications Counseling: Topical Sulfur Counseling: Patient counseled that this medication may cause skin irritation or allergic reactions.  In the event of skin irritation, the patient was advised to reduce the amount of the drug applied or use it less frequently.   The patient verbalized understanding of the proper use and possible adverse effects of topical sulfur application.  All of the patient's questions and concerns were addressed.
Spironolactone Counseling: Patient advised regarding risks of diarrhea, abdominal pain, hyperkalemia, birth defects (for female patients), liver toxicity and renal toxicity. The patient may need blood work to monitor liver and kidney function and potassium levels while on therapy. The patient verbalized understanding of the proper use and possible adverse effects of spironolactone.  All of the patient's questions and concerns were addressed.
Bactrim Pregnancy And Lactation Text: This medication is Pregnancy Category D and is known to cause fetal risk.  It is also excreted in breast milk.
Bactrim Counseling:  I discussed with the patient the risks of sulfa antibiotics including but not limited to GI upset, allergic reaction, drug rash, diarrhea, dizziness, photosensitivity, and yeast infections.  Rarely, more serious reactions can occur including but not limited to aplastic anemia, agranulocytosis, methemoglobinemia, blood dyscrasias, liver or kidney failure, lung infiltrates or desquamative/blistering drug rashes.
Isotretinoin Counseling: Patient should get monthly blood tests, not donate blood, not drive at night if vision affected, not share medication, and not undergo elective surgery for 6 months after tx completed. Side effects reviewed, pt to contact office should one occur.
Isotretinoin Pregnancy And Lactation Text: This medication is Pregnancy Category X and is considered extremely dangerous during pregnancy. It is unknown if it is excreted in breast milk.
Benzoyl Peroxide Pregnancy And Lactation Text: This medication is Pregnancy Category C. It is unknown if benzoyl peroxide is excreted in breast milk.
Birth Control Pills Counseling: Birth Control Pill Counseling: I discussed with the patient the potential side effects of OCPs including but not limited to increased risk of stroke, heart attack, thrombophlebitis, deep venous thrombosis, hepatic adenomas, breast changes, GI upset, headaches, and depression.  The patient verbalized understanding of the proper use and possible adverse effects of OCPs. All of the patient's questions and concerns were addressed.
Topical Clindamycin Counseling: Patient counseled that this medication may cause skin irritation or allergic reactions.  In the event of skin irritation, the patient was advised to reduce the amount of the drug applied or use it less frequently.   The patient verbalized understanding of the proper use and possible adverse effects of clindamycin.  All of the patient's questions and concerns were addressed.
Use Enhanced Medication Counseling?: No
Birth Control Pills Pregnancy And Lactation Text: This medication should be avoided if pregnant and for the first 30 days post-partum.
Azithromycin Counseling:  I discussed with the patient the risks of azithromycin including but not limited to GI upset, allergic reaction, drug rash, diarrhea, and yeast infections.
Dapsone Counseling: I discussed with the patient the risks of dapsone including but not limited to hemolytic anemia, agranulocytosis, rashes, methemoglobinemia, kidney failure, peripheral neuropathy, headaches, GI upset, and liver toxicity.  Patients who start dapsone require monitoring including baseline LFTs and weekly CBCs for the first month, then every month thereafter.  The patient verbalized understanding of the proper use and possible adverse effects of dapsone.  All of the patient's questions and concerns were addressed.
Erythromycin Counseling:  I discussed with the patient the risks of erythromycin including but not limited to GI upset, allergic reaction, drug rash, diarrhea, increase in liver enzymes, and yeast infections.
Tazorac Pregnancy And Lactation Text: This medication is not safe during pregnancy. It is unknown if this medication is excreted in breast milk.
Benzoyl Peroxide Counseling: Patient counseled that medicine may cause skin irritation and bleach clothing.  In the event of skin irritation, the patient was advised to reduce the amount of the drug applied or use it less frequently.   The patient verbalized understanding of the proper use and possible adverse effects of benzoyl peroxide.  All of the patient's questions and concerns were addressed.
Doxycycline Counseling:  Patient counseled regarding possible photosensitivity and increased risk for sunburn.  Patient instructed to avoid sunlight, if possible.  When exposed to sunlight, patients should wear protective clothing, sunglasses, and sunscreen.  The patient was instructed to call the office immediately if the following severe adverse effects occur:  hearing changes, easy bruising/bleeding, severe headache, or vision changes.  The patient verbalized understanding of the proper use and possible adverse effects of doxycycline.  All of the patient's questions and concerns were addressed.
High Dose Vitamin A Pregnancy And Lactation Text: High dose vitamin A therapy is contraindicated during pregnancy and breast feeding.
Topical Clindamycin Pregnancy And Lactation Text: This medication is Pregnancy Category B and is considered safe during pregnancy. It is unknown if it is excreted in breast milk.
Doxycycline Pregnancy And Lactation Text: This medication is Pregnancy Category D and not consider safe during pregnancy. It is also excreted in breast milk but is considered safe for shorter treatment courses.
Tetracycline Pregnancy And Lactation Text: This medication is Pregnancy Category D and not consider safe during pregnancy. It is also excreted in breast milk.
Minocycline Counseling: Patient advised regarding possible photosensitivity and discoloration of the teeth, skin, lips, tongue and gums.  Patient instructed to avoid sunlight, if possible.  When exposed to sunlight, patients should wear protective clothing, sunglasses, and sunscreen.  The patient was instructed to call the office immediately if the following severe adverse effects occur:  hearing changes, easy bruising/bleeding, severe headache, or vision changes.  The patient verbalized understanding of the proper use and possible adverse effects of minocycline.  All of the patient's questions and concerns were addressed.
High Dose Vitamin A Counseling: Side effects reviewed, pt to contact office should one occur.
Detail Level: Zone
Topical Sulfur Applications Pregnancy And Lactation Text: This medication is Pregnancy Category C and has an unknown safety profile during pregnancy. It is unknown if this topical medication is excreted in breast milk.
Dapsone Pregnancy And Lactation Text: This medication is Pregnancy Category C and is not considered safe during pregnancy or breast feeding.
Tazorac Counseling:  Patient advised that medication is irritating and drying.  Patient may need to apply sparingly and wash off after an hour before eventually leaving it on overnight.  The patient verbalized understanding of the proper use and possible adverse effects of tazorac.  All of the patient's questions and concerns were addressed.
Spironolactone Pregnancy And Lactation Text: This medication can cause feminization of the male fetus and should be avoided during pregnancy. The active metabolite is also found in breast milk.
Azithromycin Pregnancy And Lactation Text: This medication is considered safe during pregnancy and is also secreted in breast milk.
Topical Retinoid Pregnancy And Lactation Text: This medication is Pregnancy Category C. It is unknown if this medication is excreted in breast milk.
Tetracycline Counseling: Patient counseled regarding possible photosensitivity and increased risk for sunburn.  Patient instructed to avoid sunlight, if possible.  When exposed to sunlight, patients should wear protective clothing, sunglasses, and sunscreen.  The patient was instructed to call the office immediately if the following severe adverse effects occur:  hearing changes, easy bruising/bleeding, severe headache, or vision changes.  The patient verbalized understanding of the proper use and possible adverse effects of tetracycline.  All of the patient's questions and concerns were addressed. Patient understands to avoid pregnancy while on therapy due to potential birth defects.
Detail Level: Generalized

## 2020-07-24 NOTE — PROCEDURE: ORDER TESTS
Bill For Surgical Tray: no
Billing Type: Third-Party Bill
Expected Date Of Service: 06/22/2020
Lab Facility: [id number]
Performing Laboratory: [id number]

## 2020-07-24 NOTE — PROCEDURE: TREATMENT REGIMEN
Detail Level: Zone
Plan: Location: face\\nDuration: starting first month\\nMethod of Contraception: OBC & MLC \\nPrescribed: Absorica 40mg take one tablet by mouth daily with a fatty meal\\nPharmacy: [facility]\\niPledge: [phone]\\n\\nFAILED: SPIRONOLACTONE 100mg, Plexion topical cleanser \\n\\n**************LAB SLIP WAS GIVEN TO PT FOR BLOOD WORK 2 TO 3 DAYS BEFORE NEXT APPT.***********\\n\\nFollow up\\nLOV 06/22/2020\\nPhotos taken\\n\\nPatient is ready to start Accutane therapy after failing Spironolactone .\\nShe continues to have erythematous inflamed papules and comedonal acne that she finds very bothersome and embarrassing.\\nPt states she did some breakouts this month; the deep painful ones while on Spironolactone 100mg once daily.\\nPt did get baseline blood work two days ago.\\n\\nDiscussed with her that we will start her at 40mg dose, reiterated the usage of Cerave MC and ointment during therapy to help with dryness.\\nDiscussed with pt that blood work looks normal.\\nDiscussed with pt that we will need another blood test before she come in next month to make sure her body is processing medication well.\\nDiscussed with pt side effects to be aware of: headaches, joint pain, dryness, nose bleeds, mood changes.\\nDuring therapy, avoid donating blood while on treatment.\\nUse sunscreen with zinc oxide to prevent sun damage and block UV light.\\nDiscussed with pt that we will prescribe Absorica 40mg po qd x 30 days.\\nDiscussed with pt to take with a fatty meal.\\nDiscussed with pt that she will need to do her questions after we confirm her today.\\n\\nIPLEDGE program and consent form discussed with patient. The side effects and warnings for the use of Accutane were discussed with the patient. She understands she can not donate blood for 1 month post tx and can not have laser tx, cosmetic surgery, waxing or peels for 6 months post treatment.\\n\\nDiscussed musculoskeletal SE’s in detail. Discussed HA’s, dry skin, and depression in detail. Patient denies depression. Denies personal or family H/O Crohns, IBS, IBD, or bloody stools.\\n\\nUnderstand the need to fill Rx in 6 days and have monthly labs and OV.\\n\\n\\nPatient is to follow up in one month

## 2020-08-24 ENCOUNTER — APPOINTMENT (RX ONLY)
Dept: URBAN - METROPOLITAN AREA CLINIC 77 | Facility: CLINIC | Age: 30
Setting detail: DERMATOLOGY
End: 2020-08-24

## 2020-08-24 DIAGNOSIS — L85.3 XEROSIS CUTIS: ICD-10-CM

## 2020-08-24 DIAGNOSIS — Z79.899 OTHER LONG TERM (CURRENT) DRUG THERAPY: ICD-10-CM

## 2020-08-24 DIAGNOSIS — L70.0 ACNE VULGARIS: ICD-10-CM

## 2020-08-24 PROCEDURE — 99214 OFFICE O/P EST MOD 30 MIN: CPT

## 2020-08-24 PROCEDURE — ? COUNSELING

## 2020-08-24 PROCEDURE — ? TREATMENT REGIMEN

## 2020-08-24 PROCEDURE — ? HIGH RISK MEDICATION MONITORING

## 2020-08-24 PROCEDURE — ? PRESCRIPTION

## 2020-08-24 RX ORDER — ISOTRETINOIN 40 MG/1
CAPSULE ORAL
Qty: 60 | Refills: 0 | Status: ERX

## 2020-08-24 ASSESSMENT — LOCATION DETAILED DESCRIPTION DERM: LOCATION DETAILED: LEFT CENTRAL MALAR CHEEK

## 2020-08-24 ASSESSMENT — SEVERITY ASSESSMENT OVERALL AMONG ALL PATIENTS
IN YOUR EXPERIENCE, AMONG ALL PATIENTS YOU HAVE SEEN WITH THIS CONDITION, HOW SEVERE IS THIS PATIENT'S CONDITION?: INFLAMMATORY LESIONS MORE APPARENT; MANY COMEDONES AND PAPULES/PUSTULES, +/- FEW NODULOCYSTIC LESIONS

## 2020-08-24 ASSESSMENT — LOCATION ZONE DERM: LOCATION ZONE: FACE

## 2020-08-24 ASSESSMENT — LOCATION SIMPLE DESCRIPTION DERM: LOCATION SIMPLE: LEFT CHEEK

## 2020-08-24 NOTE — PROCEDURE: TREATMENT REGIMEN
Detail Level: Zone
Plan: Location: face\\nDuration: finishing 1st month, going on 2nd\\nMethod of Contraception: OBC & MLC \\nPrescribed: Absorica 80mg\\nPharmacy: [facility]\\niPledge: [phone]\\nFAILED: SPIRONOLACTONE 100mg, Plexion topical cleanser \\n\\n*******LAB SLIP WAS GIVEN TO PT FOR BLOOD WORK 2 TO 3 DAYS BEFORE NEXT APPT.***********\\n\\nPatient is here for an Accutane follow up and does not complain of side effects other than dryness \\nShe is currently on 40mg and has noticed a difference in her skin \\nWent over her lab results and informed her she is pre-diabetic and be mindful of what she eats and make sure to have a regular exercise habit \\nDiscussed with her that we will increase her to 80mg, reiterated the usage of Cerave MC and ointment during therapy to help with dryness.\\nDiscussed with pt that we will need another blood test before she come in next month to make sure her body is processing medication well.\\nDiscussed with pt side effects to be aware of: headaches, joint pain, dryness, nose bleeds, mood changes.\\nDuring therapy, avoid donating blood while on treatment.\\nUse sunscreen with zinc oxide to prevent sun damage and block UV light.\\nDiscussed with pt that we will prescribe Absorica 80mg po qd x 30 days.\\nDiscussed with pt to take with a fatty meal.\\nDiscussed with pt that she will need to do her questions after we confirm her today.\\n\\nIPLEDGE program and consent form discussed with patient. The side effects and warnings for the use of Accutane were discussed with the patient. She understands she can not donate blood for 1 month post tx and can not have laser tx, cosmetic surgery, waxing or peels for 6 months post treatment.\\n\\nDiscussed musculoskeletal SE’s in detail. Discussed HA’s, dry skin, and depression in detail. Patient denies depression. Denies personal or family H/O Crohns, IBS, IBD, or bloody stools.\\n\\nUnderstand the need to fill Rx in 6 days and have monthly labs and OV.\\n\\n\\nPatient is to follow up in one month

## 2020-08-24 NOTE — PROCEDURE: COUNSELING
Topical Retinoid counseling:  Patient advised to apply a pea-sized amount only at bedtime and wait 30 minutes after washing their face before applying.  If too drying, patient may add a non-comedogenic moisturizer. The patient verbalized understanding of the proper use and possible adverse effects of retinoids.  All of the patient's questions and concerns were addressed.
Erythromycin Pregnancy And Lactation Text: This medication is Pregnancy Category B and is considered safe during pregnancy. It is also excreted in breast milk.
Topical Sulfur Applications Counseling: Topical Sulfur Counseling: Patient counseled that this medication may cause skin irritation or allergic reactions.  In the event of skin irritation, the patient was advised to reduce the amount of the drug applied or use it less frequently.   The patient verbalized understanding of the proper use and possible adverse effects of topical sulfur application.  All of the patient's questions and concerns were addressed.
Spironolactone Counseling: Patient advised regarding risks of diarrhea, abdominal pain, hyperkalemia, birth defects (for female patients), liver toxicity and renal toxicity. The patient may need blood work to monitor liver and kidney function and potassium levels while on therapy. The patient verbalized understanding of the proper use and possible adverse effects of spironolactone.  All of the patient's questions and concerns were addressed.
Bactrim Pregnancy And Lactation Text: This medication is Pregnancy Category D and is known to cause fetal risk.  It is also excreted in breast milk.
Bactrim Counseling:  I discussed with the patient the risks of sulfa antibiotics including but not limited to GI upset, allergic reaction, drug rash, diarrhea, dizziness, photosensitivity, and yeast infections.  Rarely, more serious reactions can occur including but not limited to aplastic anemia, agranulocytosis, methemoglobinemia, blood dyscrasias, liver or kidney failure, lung infiltrates or desquamative/blistering drug rashes.
Isotretinoin Counseling: Patient should get monthly blood tests, not donate blood, not drive at night if vision affected, not share medication, and not undergo elective surgery for 6 months after tx completed. Side effects reviewed, pt to contact office should one occur.
Isotretinoin Pregnancy And Lactation Text: This medication is Pregnancy Category X and is considered extremely dangerous during pregnancy. It is unknown if it is excreted in breast milk.
Benzoyl Peroxide Pregnancy And Lactation Text: This medication is Pregnancy Category C. It is unknown if benzoyl peroxide is excreted in breast milk.
Birth Control Pills Counseling: Birth Control Pill Counseling: I discussed with the patient the potential side effects of OCPs including but not limited to increased risk of stroke, heart attack, thrombophlebitis, deep venous thrombosis, hepatic adenomas, breast changes, GI upset, headaches, and depression.  The patient verbalized understanding of the proper use and possible adverse effects of OCPs. All of the patient's questions and concerns were addressed.
Topical Clindamycin Counseling: Patient counseled that this medication may cause skin irritation or allergic reactions.  In the event of skin irritation, the patient was advised to reduce the amount of the drug applied or use it less frequently.   The patient verbalized understanding of the proper use and possible adverse effects of clindamycin.  All of the patient's questions and concerns were addressed.
Use Enhanced Medication Counseling?: No
Birth Control Pills Pregnancy And Lactation Text: This medication should be avoided if pregnant and for the first 30 days post-partum.
Azithromycin Counseling:  I discussed with the patient the risks of azithromycin including but not limited to GI upset, allergic reaction, drug rash, diarrhea, and yeast infections.
Dapsone Counseling: I discussed with the patient the risks of dapsone including but not limited to hemolytic anemia, agranulocytosis, rashes, methemoglobinemia, kidney failure, peripheral neuropathy, headaches, GI upset, and liver toxicity.  Patients who start dapsone require monitoring including baseline LFTs and weekly CBCs for the first month, then every month thereafter.  The patient verbalized understanding of the proper use and possible adverse effects of dapsone.  All of the patient's questions and concerns were addressed.
Erythromycin Counseling:  I discussed with the patient the risks of erythromycin including but not limited to GI upset, allergic reaction, drug rash, diarrhea, increase in liver enzymes, and yeast infections.
Tazorac Pregnancy And Lactation Text: This medication is not safe during pregnancy. It is unknown if this medication is excreted in breast milk.
Benzoyl Peroxide Counseling: Patient counseled that medicine may cause skin irritation and bleach clothing.  In the event of skin irritation, the patient was advised to reduce the amount of the drug applied or use it less frequently.   The patient verbalized understanding of the proper use and possible adverse effects of benzoyl peroxide.  All of the patient's questions and concerns were addressed.
Doxycycline Counseling:  Patient counseled regarding possible photosensitivity and increased risk for sunburn.  Patient instructed to avoid sunlight, if possible.  When exposed to sunlight, patients should wear protective clothing, sunglasses, and sunscreen.  The patient was instructed to call the office immediately if the following severe adverse effects occur:  hearing changes, easy bruising/bleeding, severe headache, or vision changes.  The patient verbalized understanding of the proper use and possible adverse effects of doxycycline.  All of the patient's questions and concerns were addressed.
High Dose Vitamin A Pregnancy And Lactation Text: High dose vitamin A therapy is contraindicated during pregnancy and breast feeding.
Topical Clindamycin Pregnancy And Lactation Text: This medication is Pregnancy Category B and is considered safe during pregnancy. It is unknown if it is excreted in breast milk.
Doxycycline Pregnancy And Lactation Text: This medication is Pregnancy Category D and not consider safe during pregnancy. It is also excreted in breast milk but is considered safe for shorter treatment courses.
Tetracycline Pregnancy And Lactation Text: This medication is Pregnancy Category D and not consider safe during pregnancy. It is also excreted in breast milk.
Minocycline Counseling: Patient advised regarding possible photosensitivity and discoloration of the teeth, skin, lips, tongue and gums.  Patient instructed to avoid sunlight, if possible.  When exposed to sunlight, patients should wear protective clothing, sunglasses, and sunscreen.  The patient was instructed to call the office immediately if the following severe adverse effects occur:  hearing changes, easy bruising/bleeding, severe headache, or vision changes.  The patient verbalized understanding of the proper use and possible adverse effects of minocycline.  All of the patient's questions and concerns were addressed.
High Dose Vitamin A Counseling: Side effects reviewed, pt to contact office should one occur.
Detail Level: Zone
Topical Sulfur Applications Pregnancy And Lactation Text: This medication is Pregnancy Category C and has an unknown safety profile during pregnancy. It is unknown if this topical medication is excreted in breast milk.
Dapsone Pregnancy And Lactation Text: This medication is Pregnancy Category C and is not considered safe during pregnancy or breast feeding.
Tazorac Counseling:  Patient advised that medication is irritating and drying.  Patient may need to apply sparingly and wash off after an hour before eventually leaving it on overnight.  The patient verbalized understanding of the proper use and possible adverse effects of tazorac.  All of the patient's questions and concerns were addressed.
Spironolactone Pregnancy And Lactation Text: This medication can cause feminization of the male fetus and should be avoided during pregnancy. The active metabolite is also found in breast milk.
Azithromycin Pregnancy And Lactation Text: This medication is considered safe during pregnancy and is also secreted in breast milk.
Topical Retinoid Pregnancy And Lactation Text: This medication is Pregnancy Category C. It is unknown if this medication is excreted in breast milk.
Tetracycline Counseling: Patient counseled regarding possible photosensitivity and increased risk for sunburn.  Patient instructed to avoid sunlight, if possible.  When exposed to sunlight, patients should wear protective clothing, sunglasses, and sunscreen.  The patient was instructed to call the office immediately if the following severe adverse effects occur:  hearing changes, easy bruising/bleeding, severe headache, or vision changes.  The patient verbalized understanding of the proper use and possible adverse effects of tetracycline.  All of the patient's questions and concerns were addressed. Patient understands to avoid pregnancy while on therapy due to potential birth defects.
Detail Level: Generalized
Sarecycline Counseling: Patient advised regarding possible photosensitivity and discoloration of the teeth, skin, lips, tongue and gums.  Patient instructed to avoid sunlight, if possible.  When exposed to sunlight, patients should wear protective clothing, sunglasses, and sunscreen.  The patient was instructed to call the office immediately if the following severe adverse effects occur:  hearing changes, easy bruising/bleeding, severe headache, or vision changes.  The patient verbalized understanding of the proper use and possible adverse effects of sarecycline.  All of the patient's questions and concerns were addressed.

## 2020-09-28 ENCOUNTER — APPOINTMENT (RX ONLY)
Dept: URBAN - METROPOLITAN AREA CLINIC 77 | Facility: CLINIC | Age: 30
Setting detail: DERMATOLOGY
End: 2020-09-28

## 2020-09-28 DIAGNOSIS — L85.3 XEROSIS CUTIS: ICD-10-CM

## 2020-09-28 DIAGNOSIS — L70.0 ACNE VULGARIS: ICD-10-CM

## 2020-09-28 DIAGNOSIS — Z79.899 OTHER LONG TERM (CURRENT) DRUG THERAPY: ICD-10-CM

## 2020-09-28 PROCEDURE — ? HIGH RISK MEDICATION MONITORING

## 2020-09-28 PROCEDURE — ? PRESCRIPTION

## 2020-09-28 PROCEDURE — ? TREATMENT REGIMEN

## 2020-09-28 PROCEDURE — 99214 OFFICE O/P EST MOD 30 MIN: CPT

## 2020-09-28 PROCEDURE — ? COUNSELING

## 2020-09-28 RX ORDER — ISOTRETINOIN 40 MG/1
CAPSULE ORAL
Qty: 60 | Refills: 0 | Status: ERX

## 2020-09-28 RX ORDER — NORGESTIMATE AND ETHINYL ESTRADIOL 7DAYSX3 28
KIT ORAL
Qty: 28 | Refills: 6 | Status: ERX | COMMUNITY
Start: 2020-09-28

## 2020-09-28 RX ADMIN — NORGESTIMATE AND ETHINYL ESTRADIOL: KIT at 00:00

## 2020-09-28 ASSESSMENT — LOCATION DETAILED DESCRIPTION DERM: LOCATION DETAILED: LEFT CENTRAL MALAR CHEEK

## 2020-09-28 ASSESSMENT — LOCATION SIMPLE DESCRIPTION DERM: LOCATION SIMPLE: LEFT CHEEK

## 2020-09-28 ASSESSMENT — LOCATION ZONE DERM: LOCATION ZONE: FACE

## 2020-09-28 NOTE — PROCEDURE: COUNSELING
Topical Retinoid counseling:  Patient advised to apply a pea-sized amount only at bedtime and wait 30 minutes after washing their face before applying.  If too drying, patient may add a non-comedogenic moisturizer. The patient verbalized understanding of the proper use and possible adverse effects of retinoids.  All of the patient's questions and concerns were addressed.
Erythromycin Pregnancy And Lactation Text: This medication is Pregnancy Category B and is considered safe during pregnancy. It is also excreted in breast milk.
Topical Sulfur Applications Counseling: Topical Sulfur Counseling: Patient counseled that this medication may cause skin irritation or allergic reactions.  In the event of skin irritation, the patient was advised to reduce the amount of the drug applied or use it less frequently.   The patient verbalized understanding of the proper use and possible adverse effects of topical sulfur application.  All of the patient's questions and concerns were addressed.
Spironolactone Counseling: Patient advised regarding risks of diarrhea, abdominal pain, hyperkalemia, birth defects (for female patients), liver toxicity and renal toxicity. The patient may need blood work to monitor liver and kidney function and potassium levels while on therapy. The patient verbalized understanding of the proper use and possible adverse effects of spironolactone.  All of the patient's questions and concerns were addressed.
Bactrim Pregnancy And Lactation Text: This medication is Pregnancy Category D and is known to cause fetal risk.  It is also excreted in breast milk.
Bactrim Counseling:  I discussed with the patient the risks of sulfa antibiotics including but not limited to GI upset, allergic reaction, drug rash, diarrhea, dizziness, photosensitivity, and yeast infections.  Rarely, more serious reactions can occur including but not limited to aplastic anemia, agranulocytosis, methemoglobinemia, blood dyscrasias, liver or kidney failure, lung infiltrates or desquamative/blistering drug rashes.
Isotretinoin Counseling: Patient should get monthly blood tests, not donate blood, not drive at night if vision affected, not share medication, and not undergo elective surgery for 6 months after tx completed. Side effects reviewed, pt to contact office should one occur.
Isotretinoin Pregnancy And Lactation Text: This medication is Pregnancy Category X and is considered extremely dangerous during pregnancy. It is unknown if it is excreted in breast milk.
Benzoyl Peroxide Pregnancy And Lactation Text: This medication is Pregnancy Category C. It is unknown if benzoyl peroxide is excreted in breast milk.
Birth Control Pills Counseling: Birth Control Pill Counseling: I discussed with the patient the potential side effects of OCPs including but not limited to increased risk of stroke, heart attack, thrombophlebitis, deep venous thrombosis, hepatic adenomas, breast changes, GI upset, headaches, and depression.  The patient verbalized understanding of the proper use and possible adverse effects of OCPs. All of the patient's questions and concerns were addressed.
Topical Clindamycin Counseling: Patient counseled that this medication may cause skin irritation or allergic reactions.  In the event of skin irritation, the patient was advised to reduce the amount of the drug applied or use it less frequently.   The patient verbalized understanding of the proper use and possible adverse effects of clindamycin.  All of the patient's questions and concerns were addressed.
Use Enhanced Medication Counseling?: No
Birth Control Pills Pregnancy And Lactation Text: This medication should be avoided if pregnant and for the first 30 days post-partum.
Azithromycin Counseling:  I discussed with the patient the risks of azithromycin including but not limited to GI upset, allergic reaction, drug rash, diarrhea, and yeast infections.
Dapsone Counseling: I discussed with the patient the risks of dapsone including but not limited to hemolytic anemia, agranulocytosis, rashes, methemoglobinemia, kidney failure, peripheral neuropathy, headaches, GI upset, and liver toxicity.  Patients who start dapsone require monitoring including baseline LFTs and weekly CBCs for the first month, then every month thereafter.  The patient verbalized understanding of the proper use and possible adverse effects of dapsone.  All of the patient's questions and concerns were addressed.
Erythromycin Counseling:  I discussed with the patient the risks of erythromycin including but not limited to GI upset, allergic reaction, drug rash, diarrhea, increase in liver enzymes, and yeast infections.
Tazorac Pregnancy And Lactation Text: This medication is not safe during pregnancy. It is unknown if this medication is excreted in breast milk.
Benzoyl Peroxide Counseling: Patient counseled that medicine may cause skin irritation and bleach clothing.  In the event of skin irritation, the patient was advised to reduce the amount of the drug applied or use it less frequently.   The patient verbalized understanding of the proper use and possible adverse effects of benzoyl peroxide.  All of the patient's questions and concerns were addressed.
Doxycycline Counseling:  Patient counseled regarding possible photosensitivity and increased risk for sunburn.  Patient instructed to avoid sunlight, if possible.  When exposed to sunlight, patients should wear protective clothing, sunglasses, and sunscreen.  The patient was instructed to call the office immediately if the following severe adverse effects occur:  hearing changes, easy bruising/bleeding, severe headache, or vision changes.  The patient verbalized understanding of the proper use and possible adverse effects of doxycycline.  All of the patient's questions and concerns were addressed.
High Dose Vitamin A Pregnancy And Lactation Text: High dose vitamin A therapy is contraindicated during pregnancy and breast feeding.
Topical Clindamycin Pregnancy And Lactation Text: This medication is Pregnancy Category B and is considered safe during pregnancy. It is unknown if it is excreted in breast milk.
Doxycycline Pregnancy And Lactation Text: This medication is Pregnancy Category D and not consider safe during pregnancy. It is also excreted in breast milk but is considered safe for shorter treatment courses.
Tetracycline Pregnancy And Lactation Text: This medication is Pregnancy Category D and not consider safe during pregnancy. It is also excreted in breast milk.
Minocycline Counseling: Patient advised regarding possible photosensitivity and discoloration of the teeth, skin, lips, tongue and gums.  Patient instructed to avoid sunlight, if possible.  When exposed to sunlight, patients should wear protective clothing, sunglasses, and sunscreen.  The patient was instructed to call the office immediately if the following severe adverse effects occur:  hearing changes, easy bruising/bleeding, severe headache, or vision changes.  The patient verbalized understanding of the proper use and possible adverse effects of minocycline.  All of the patient's questions and concerns were addressed.
High Dose Vitamin A Counseling: Side effects reviewed, pt to contact office should one occur.
Detail Level: Zone
Topical Sulfur Applications Pregnancy And Lactation Text: This medication is Pregnancy Category C and has an unknown safety profile during pregnancy. It is unknown if this topical medication is excreted in breast milk.
Dapsone Pregnancy And Lactation Text: This medication is Pregnancy Category C and is not considered safe during pregnancy or breast feeding.
Tazorac Counseling:  Patient advised that medication is irritating and drying.  Patient may need to apply sparingly and wash off after an hour before eventually leaving it on overnight.  The patient verbalized understanding of the proper use and possible adverse effects of tazorac.  All of the patient's questions and concerns were addressed.
Spironolactone Pregnancy And Lactation Text: This medication can cause feminization of the male fetus and should be avoided during pregnancy. The active metabolite is also found in breast milk.
Azithromycin Pregnancy And Lactation Text: This medication is considered safe during pregnancy and is also secreted in breast milk.
Topical Retinoid Pregnancy And Lactation Text: This medication is Pregnancy Category C. It is unknown if this medication is excreted in breast milk.
Tetracycline Counseling: Patient counseled regarding possible photosensitivity and increased risk for sunburn.  Patient instructed to avoid sunlight, if possible.  When exposed to sunlight, patients should wear protective clothing, sunglasses, and sunscreen.  The patient was instructed to call the office immediately if the following severe adverse effects occur:  hearing changes, easy bruising/bleeding, severe headache, or vision changes.  The patient verbalized understanding of the proper use and possible adverse effects of tetracycline.  All of the patient's questions and concerns were addressed. Patient understands to avoid pregnancy while on therapy due to potential birth defects.
Sarecycline Counseling: Patient advised regarding possible photosensitivity and discoloration of the teeth, skin, lips, tongue and gums.  Patient instructed to avoid sunlight, if possible.  When exposed to sunlight, patients should wear protective clothing, sunglasses, and sunscreen.  The patient was instructed to call the office immediately if the following severe adverse effects occur:  hearing changes, easy bruising/bleeding, severe headache, or vision changes.  The patient verbalized understanding of the proper use and possible adverse effects of sarecycline.  All of the patient's questions and concerns were addressed.
Detail Level: Generalized

## 2020-09-28 NOTE — PROCEDURE: TREATMENT REGIMEN
Detail Level: Zone
Plan: Location: Face \\nDuration: Completed 2nd month going on 3rd month \\nMethod of Contraception: OBC & MLC \\nPrescribed: Absorica 40mg x 60 capsules \\nPharmacy: [facility]\\niPledge: [phone]\\n\\n\\nFAILED: SPIRONOLACTONE 100mg, Plexion topical cleanser \\n\\n07/24/2020 - Absorica 40mg x 30 capsules \\n08/24/2020- Absorica 40mg x 60 capsules \\n09/28/2020- Absorica 40mg x 60 capsules -- 2 capsules on even days and 1 capsule on odd days \\n\\n\\[id number]\n\\nPatient is here for a follow up \\nShe has completed 2 months on Accutane \\nPatient is currently taking 2 capsules daily with a fatty meal \\n-- Reviewed blood work with patient informed her that her Triglycerides were high \\n\\nDiscussed with patient upon examination that her skin is significantly improved \\nDiscussed with her that acne is an inflammatory condition triggered by stress, lack of sleep and also has a major genetic component \\n\\nPLAN: \\nToday i will continue patient on the Accutane treatment, however i will have to tweak her regimen since her Triglyceride levels were high \\n-- I will have patient take Absorica 40mg ( 2 capsules ) on even days and Absorica 40mg ( 1 capsule) on odd days \\nPatient was provided with a request form to have blood work done 1 week before her next office visit \\nDiscussed with patient side effects to be aware of: headaches, joint pain, dryness, nose bleeds, mood changes.\\nDuring therapy, avoid donating blood while on treatment.\\nUse sunscreen with zinc oxide to prevent sun damage and block UV light.\\nDiscussed with patient to take with a fatty meal in order to obtain the full absorption \\nDiscussed with patient that she will need to do her questions after i confirm her today\\nA prescription was sent today for Absorica 40mg with the quantity of 60 capsules instructions will say to take 2 capsules daily however she is to take as instructed \\n\\nIPLEDGE program and consent form discussed with patient. The side effects and warnings for the use of Accutane were discussed with the patient. She understands she can not donate blood for 1 month post tx and can not have laser tx, cosmetic surgery, waxing or peels for 6 months post treatment.\\nDiscussed musculoskeletal SE’s in detail. Discussed HA’s, dry skin, and depression in detail. Patient denies depression. Denies personal or family H/O Crohns, IBS, IBD, or bloody stools.\\nUnderstand the need to fill Rx in 6 days and have monthly labs and OV.\\n\\n\\nFollow up: 1 month

## 2020-10-26 ENCOUNTER — APPOINTMENT (RX ONLY)
Dept: URBAN - METROPOLITAN AREA CLINIC 77 | Facility: CLINIC | Age: 30
Setting detail: DERMATOLOGY
End: 2020-10-26

## 2020-10-26 DIAGNOSIS — Z79.899 OTHER LONG TERM (CURRENT) DRUG THERAPY: ICD-10-CM

## 2020-10-26 DIAGNOSIS — L85.3 XEROSIS CUTIS: ICD-10-CM

## 2020-10-26 DIAGNOSIS — L70.0 ACNE VULGARIS: ICD-10-CM

## 2020-10-26 PROCEDURE — 99214 OFFICE O/P EST MOD 30 MIN: CPT

## 2020-10-26 PROCEDURE — ? COUNSELING

## 2020-10-26 PROCEDURE — ? TREATMENT REGIMEN

## 2020-10-26 PROCEDURE — ? PRESCRIPTION

## 2020-10-26 PROCEDURE — ? HIGH RISK MEDICATION MONITORING

## 2020-10-26 RX ORDER — ISOTRETINOIN 40 MG/1
CAPSULE ORAL
Qty: 60 | Refills: 0 | Status: ERX

## 2020-10-26 ASSESSMENT — LOCATION SIMPLE DESCRIPTION DERM: LOCATION SIMPLE: LEFT CHEEK

## 2020-10-26 ASSESSMENT — LOCATION ZONE DERM: LOCATION ZONE: FACE

## 2020-10-26 ASSESSMENT — LOCATION DETAILED DESCRIPTION DERM: LOCATION DETAILED: LEFT CENTRAL MALAR CHEEK

## 2020-10-26 NOTE — PROCEDURE: TREATMENT REGIMEN
Detail Level: Zone
Plan: Location: Face \\nDuration: Finished 3month going on to 4th month \\nMethod of Contraception: OBC & MLC \\nPrescribed: Absorica 40mg x 60 capsules \\nPharmacy: [facility]\\niPledge: [phone]\\n\\n\\nFAILED: SPIRONOLACTONE 100mg, Plexion topical cleanser \\n\\n07/24/2020 - Absorica 40mg x 30 capsules \\n08/24/2020- Absorica 40mg x 60 capsules \\n09/28/2020- Absorica 40mg x 60 capsules -- 2 capsules on even days and 1 capsule on odd days \\n10/26/2020- Absorica 40mg x 60 capsules -- Now Start. with 2 capsules daily \\n\\n10/26/2020\\n\\nPatient is here for a follow up \\nShe has completed 3 months, she was taking medication 2 capsules on even and 1 capsule on odd day \\n-- Reviewed blood work with patient informed her that her Triglycerides are normal \\nShe mentions she is currently not experiencing any side effects on the dosage she is on \\nDiscussed with her that acne is an inflammatory condition triggered by stress, lack of sleep and also has a major genetic component \\n\\nPLAN: \\nToday i will continue patient on the Accutane treatment\\nDiscussed with the patient that I will allow her go on 2 capsules once a day with a fatty meal since her triglycerides levels are normal \\nI will today be providing patient with a lab slip to have blood work performed to make sure her liver enzymes are fine on 2 capsules daily \\nDiscussed with patient side effects to be aware of: headaches, joint pain, dryness, nose bleeds, mood changes.\\nDuring therapy, avoid donating blood while on treatment.\\nUse sunscreen with zinc oxide to prevent sun damage and block UV light.\\nDiscussed with patient to take with a fatty meal in order to obtain the full absorption \\nDiscussed with patient that she will need to do her questions after i confirm her today and then she can call and request her medication at [facility] \\n\\nIPLEDGE program and consent form discussed with patient. The side effects and warnings for the use of Accutane were discussed with the patient. She understands she can not donate blood for 1 month post tx and can not have laser tx, cosmetic surgery, waxing or peels for 6 months post treatment.\\nDiscussed musculoskeletal SE’s in detail. Discussed HA’s, dry skin, and depression in detail. Patient denies depression. Denies personal or family H/O Crohns, IBS, IBD, or bloody stools.\\nUnderstand the need to fill Rx in 6 days and have monthly labs and OV.\\n\\n\\nFollow up: 1 month

## 2020-12-14 ENCOUNTER — APPOINTMENT (RX ONLY)
Dept: URBAN - METROPOLITAN AREA CLINIC 77 | Facility: CLINIC | Age: 30
Setting detail: DERMATOLOGY
End: 2020-12-14

## 2020-12-14 DIAGNOSIS — Z79.899 OTHER LONG TERM (CURRENT) DRUG THERAPY: ICD-10-CM

## 2020-12-14 DIAGNOSIS — L70.0 ACNE VULGARIS: ICD-10-CM

## 2020-12-14 DIAGNOSIS — L85.3 XEROSIS CUTIS: ICD-10-CM

## 2020-12-14 PROCEDURE — 99214 OFFICE O/P EST MOD 30 MIN: CPT

## 2020-12-14 PROCEDURE — ? TREATMENT REGIMEN

## 2020-12-14 PROCEDURE — ? COUNSELING

## 2020-12-14 PROCEDURE — ? HIGH RISK MEDICATION MONITORING

## 2020-12-14 PROCEDURE — ? PRESCRIPTION

## 2020-12-14 RX ORDER — ISOTRETINOIN 40 MG/1
CAPSULE ORAL
Qty: 60 | Refills: 0 | Status: ERX

## 2020-12-14 ASSESSMENT — LOCATION SIMPLE DESCRIPTION DERM: LOCATION SIMPLE: LEFT CHEEK

## 2020-12-14 ASSESSMENT — LOCATION DETAILED DESCRIPTION DERM: LOCATION DETAILED: LEFT CENTRAL MALAR CHEEK

## 2020-12-14 ASSESSMENT — LOCATION ZONE DERM: LOCATION ZONE: FACE

## 2020-12-14 NOTE — PROCEDURE: TREATMENT REGIMEN
Detail Level: Zone
Plan: Location: Face \\nDuration: Finished 4th month going on 5th month \\nMethod of Contraception: OBC & MLC \\nPrescribed: Absorica 40mg x 60 capsules \\nPharmacy: [facility]\\niPledge: [phone]\\n\\n\\nFAILED: SPIRONOLACTONE 100mg, Plexion topical cleanser \\n\\n07/24/2020 - Absorica 40mg x 30 capsules \\n08/24/2020- Absorica 40mg x 60 capsules \\n09/28/2020- Absorica 40mg x 60 capsules -- 2 capsules on even days and 1 capsule on odd days \\n10/26/2020- Absorica 40mg x 60 capsules -- Now Start. with 2 capsules daily \\n12/14/2020- Absorica 40mg x 60 capsules -- 2 capsules on even days and 1 capsule on odd days \\n\\n12/14/2020\\n\\nPatient is here for a follow up \\nShe has completed 4 months on Accutane, she's taking 2 capsules once a day with a fatty meal \\n-- Reviewed blood work with patient informed her that her Triglycerides are elevated again \\nPatient mentions she is currently under stress due to multiple members in her family loosing their lives from COVID-19\\n\\nDiscussed with patient that acne is an inflammatory condition triggered by stress, lack of sleep and also has a major genetic component \\nDiscussed with her that her being under stress can trigger her AST/ALT levels, however bad eating habits and no exercise can do it as well \\n\\nPLAN: \\nToday i will have patient take 2 capsules on even days and 1 capsule on odd days, i will have patient repeat the blood work (REQ. form) was provided to the patient in the office \\nA prescription for Absorica 40mg with that quantity of 60 capsules, instructions will say for her to take 2 times a day however she is to take AS INSTRUCTED \\nDiscussed with patient side effects to be aware of: headaches, joint pain, dryness, nose bleeds, mood changes.\\nDuring therapy, avoid donating blood while on treatment.\\nUse sunscreen with zinc oxide to prevent sun damage and block UV light.\\nDiscussed with patient to take with a fatty meal in order to obtain the full absorption \\nDiscussed with patient that she will need to do her questions after i confirm her today and then she can call and request her medication at [facility] \\n\\nIP[employer]orum program and consent form discussed with patient. The side effects and warnings for the use of Accutane were discussed with the patient. She understands she can not donate blood for 1 month post tx and can not have laser tx, cosmetic surgery, waxing or peels for 6 months post treatment.\\nDiscussed musculoskeletal SE’s in detail. Discussed HA’s, dry skin, and depression in detail. Patient denies depression. Denies personal or family H/O Crohns, IBS, IBD, or bloody stools.\\nUnderstand the need to fill Rx in 6 days and have monthly labs and OV.\\n\\nFollow up: 1 month

## 2021-01-25 ENCOUNTER — APPOINTMENT (RX ONLY)
Dept: URBAN - METROPOLITAN AREA CLINIC 77 | Facility: CLINIC | Age: 31
Setting detail: DERMATOLOGY
End: 2021-01-25

## 2021-01-25 DIAGNOSIS — L85.3 XEROSIS CUTIS: ICD-10-CM

## 2021-01-25 DIAGNOSIS — L70.0 ACNE VULGARIS: ICD-10-CM

## 2021-01-25 DIAGNOSIS — Z79.899 OTHER LONG TERM (CURRENT) DRUG THERAPY: ICD-10-CM

## 2021-01-25 PROCEDURE — ? PRESCRIPTION

## 2021-01-25 PROCEDURE — 99214 OFFICE O/P EST MOD 30 MIN: CPT

## 2021-01-25 PROCEDURE — ? HIGH RISK MEDICATION MONITORING

## 2021-01-25 PROCEDURE — ? COUNSELING

## 2021-01-25 PROCEDURE — ? TREATMENT REGIMEN

## 2021-01-25 RX ORDER — ISOTRETINOIN 40 MG/1
CAPSULE ORAL
Qty: 60 | Refills: 0 | Status: ERX

## 2021-01-25 ASSESSMENT — LOCATION ZONE DERM: LOCATION ZONE: FACE

## 2021-01-25 ASSESSMENT — LOCATION DETAILED DESCRIPTION DERM
LOCATION DETAILED: LEFT INFERIOR MEDIAL MALAR CHEEK
LOCATION DETAILED: LEFT CENTRAL MALAR CHEEK

## 2021-01-25 ASSESSMENT — LOCATION SIMPLE DESCRIPTION DERM: LOCATION SIMPLE: LEFT CHEEK

## 2021-01-25 NOTE — PROCEDURE: TREATMENT REGIMEN
Detail Level: Zone
Plan: Location: Face \\nDuration: Finished 5th month going on 6th month \\nMethod of Contraception: OBC & MLC \\nPrescribed: Absorica 40mg x 60 capsules \\nPharmacy: [facility]\\niPledge: [phone]\\n\\n\\nFAILED: SPIRONOLACTONE 100mg, Plexion topical cleanser \\n\\n07/24/2020 - Absorica 40mg x 30 capsules \\n08/24/2020- Absorica 40mg x 60 capsules \\n09/28/2020- Absorica 40mg x 60 capsules -- 2 capsules on even days and 1 capsule on odd days \\n10/26/2020- Absorica 40mg x 60 capsules -- Now Start. with 2 capsules daily \\n12/14/2020- Absorica 40mg x 60 capsules -- 2 capsules on even days and 1 capsule on odd days \\n\\n1/25/2021\\n\\nPatient is here for a follow up \\nShe has completed 5 months on Accutane, she's taking 2 capsules on even days and 1 on odd days with a fatty meal.\\nShe states that her skin has cleared up since starting Accutane therapy.\\nShe doesn’t complain of any side effects aside from dryness.\\n-- Reviewed blood work with patient informed her that her Triglycerides are elevated again \\nPatient mentions she is currently under stress.\\n\\nToday, I will have patient take 1 capsule after being off the medication for 1 week.\\nI will have patient repeat the blood work (REQ. form) was provided to the patient in the office \\nA prescription for Absorica 40mg with that quantity of 60 capsules, instructions will say for her to take 2 times a day however she is to take AS INSTRUCTED \\nDiscussed with patient side effects to be aware of: headaches, joint pain, dryness, nose bleeds, mood changes.\\nDuring therapy, avoid donating blood while on treatment.\\nUse sunscreen with zinc oxide to prevent sun damage and block UV light.\\nDiscussed with patient to take with a fatty meal in order to obtain the full absorption \\nDiscussed with patient that she will need to do her questions after i confirm her today and then she can call and request her medication at [facility] \\n\\nIPLEDGE program and consent form discussed with patient. The side effects and warnings for the use of Accutane were discussed with the patient. She understands she can not donate blood for 1 month post tx and can not have laser tx, cosmetic surgery, waxing or peels for 6 months post treatment.\\nDiscussed musculoskeletal SE’s in detail. Discussed HA’s, dry skin, and depression in detail. Patient denies depression. Denies personal or family H/O Crohns, IBS, IBD, or bloody stools.\\nUnderstand the need to fill Rx in 6 days and have monthly labs and OV.\\n\\nFollow up: 1 month

## 2021-02-02 ENCOUNTER — RX ONLY (OUTPATIENT)
Age: 31
Setting detail: RX ONLY
End: 2021-02-02

## 2021-02-02 ENCOUNTER — APPOINTMENT (RX ONLY)
Dept: URBAN - METROPOLITAN AREA CLINIC 77 | Facility: CLINIC | Age: 31
Setting detail: DERMATOLOGY
End: 2021-02-02

## 2021-02-02 DIAGNOSIS — Z32.0 ENCOUNTER FOR PREGNANCY TEST: ICD-10-CM

## 2021-02-02 PROBLEM — Z32.02 ENCOUNTER FOR PREGNANCY TEST, RESULT NEGATIVE: Status: ACTIVE | Noted: 2021-02-02

## 2021-02-02 PROCEDURE — 81025 URINE PREGNANCY TEST: CPT

## 2021-02-02 PROCEDURE — ? URINE PREGNANCY TEST

## 2021-02-02 RX ORDER — ISOTRETINOIN 40 MG/1
CAPSULE ORAL
Qty: 60 | Refills: 0 | Status: ERX

## 2021-02-02 NOTE — PROCEDURE: URINE PREGNANCY TEST
Performed By: Josephine Romero
Expiration Date (Optional): 3/31/23
Detail Level: None
Urine Pregnancy Test Result: negative
Lot # (Optional): [id number]

## 2021-03-08 ENCOUNTER — APPOINTMENT (RX ONLY)
Dept: URBAN - METROPOLITAN AREA CLINIC 77 | Facility: CLINIC | Age: 31
Setting detail: DERMATOLOGY
End: 2021-03-08

## 2021-03-08 DIAGNOSIS — Z79.899 OTHER LONG TERM (CURRENT) DRUG THERAPY: ICD-10-CM

## 2021-03-08 DIAGNOSIS — L70.0 ACNE VULGARIS: ICD-10-CM | Status: INADEQUATELY CONTROLLED

## 2021-03-08 DIAGNOSIS — L85.3 XEROSIS CUTIS: ICD-10-CM | Status: INADEQUATELY CONTROLLED

## 2021-03-08 PROCEDURE — 99214 OFFICE O/P EST MOD 30 MIN: CPT

## 2021-03-08 PROCEDURE — ? HIGH RISK MEDICATION MONITORING

## 2021-03-08 PROCEDURE — ? PRESCRIPTION MEDICATION MANAGEMENT

## 2021-03-08 PROCEDURE — ? COUNSELING

## 2021-03-08 ASSESSMENT — LOCATION SIMPLE DESCRIPTION DERM: LOCATION SIMPLE: LEFT CHEEK

## 2021-03-08 ASSESSMENT — LOCATION DETAILED DESCRIPTION DERM
LOCATION DETAILED: LEFT CENTRAL MALAR CHEEK
LOCATION DETAILED: LEFT INFERIOR CENTRAL MALAR CHEEK

## 2021-03-08 ASSESSMENT — LOCATION ZONE DERM: LOCATION ZONE: FACE

## 2021-03-08 ASSESSMENT — SEVERITY ASSESSMENT OVERALL AMONG ALL PATIENTS
IN YOUR EXPERIENCE, AMONG ALL PATIENTS YOU HAVE SEEN WITH THIS CONDITION, HOW SEVERE IS THIS PATIENT'S CONDITION?: MULTIPLE INFLAMMATORY LESIONS BUT NONINFLAMMATORY LESIONS PREDOMINATE

## 2021-03-08 NOTE — PROCEDURE: COUNSELING
Topical Retinoid counseling:  Patient advised to apply a pea-sized amount only at bedtime and wait 30 minutes after washing their face before applying.  If too drying, patient may add a non-comedogenic moisturizer. The patient verbalized understanding of the proper use and possible adverse effects of retinoids.  All of the patient's questions and concerns were addressed.
Erythromycin Pregnancy And Lactation Text: This medication is Pregnancy Category B and is considered safe during pregnancy. It is also excreted in breast milk.
Topical Sulfur Applications Counseling: Topical Sulfur Counseling: Patient counseled that this medication may cause skin irritation or allergic reactions.  In the event of skin irritation, the patient was advised to reduce the amount of the drug applied or use it less frequently.   The patient verbalized understanding of the proper use and possible adverse effects of topical sulfur application.  All of the patient's questions and concerns were addressed.
Spironolactone Counseling: Patient advised regarding risks of diarrhea, abdominal pain, hyperkalemia, birth defects (for female patients), liver toxicity and renal toxicity. The patient may need blood work to monitor liver and kidney function and potassium levels while on therapy. The patient verbalized understanding of the proper use and possible adverse effects of spironolactone.  All of the patient's questions and concerns were addressed.
Bactrim Pregnancy And Lactation Text: This medication is Pregnancy Category D and is known to cause fetal risk.  It is also excreted in breast milk.
Bactrim Counseling:  I discussed with the patient the risks of sulfa antibiotics including but not limited to GI upset, allergic reaction, drug rash, diarrhea, dizziness, photosensitivity, and yeast infections.  Rarely, more serious reactions can occur including but not limited to aplastic anemia, agranulocytosis, methemoglobinemia, blood dyscrasias, liver or kidney failure, lung infiltrates or desquamative/blistering drug rashes.
Isotretinoin Counseling: Patient should get monthly blood tests, not donate blood, not drive at night if vision affected, not share medication, and not undergo elective surgery for 6 months after tx completed. Side effects reviewed, pt to contact office should one occur.
Isotretinoin Pregnancy And Lactation Text: This medication is Pregnancy Category X and is considered extremely dangerous during pregnancy. It is unknown if it is excreted in breast milk.
Benzoyl Peroxide Pregnancy And Lactation Text: This medication is Pregnancy Category C. It is unknown if benzoyl peroxide is excreted in breast milk.
Birth Control Pills Counseling: Birth Control Pill Counseling: I discussed with the patient the potential side effects of OCPs including but not limited to increased risk of stroke, heart attack, thrombophlebitis, deep venous thrombosis, hepatic adenomas, breast changes, GI upset, headaches, and depression.  The patient verbalized understanding of the proper use and possible adverse effects of OCPs. All of the patient's questions and concerns were addressed.
Topical Clindamycin Counseling: Patient counseled that this medication may cause skin irritation or allergic reactions.  In the event of skin irritation, the patient was advised to reduce the amount of the drug applied or use it less frequently.   The patient verbalized understanding of the proper use and possible adverse effects of clindamycin.  All of the patient's questions and concerns were addressed.
Use Enhanced Medication Counseling?: No
Birth Control Pills Pregnancy And Lactation Text: This medication should be avoided if pregnant and for the first 30 days post-partum.
Azithromycin Counseling:  I discussed with the patient the risks of azithromycin including but not limited to GI upset, allergic reaction, drug rash, diarrhea, and yeast infections.
Dapsone Counseling: I discussed with the patient the risks of dapsone including but not limited to hemolytic anemia, agranulocytosis, rashes, methemoglobinemia, kidney failure, peripheral neuropathy, headaches, GI upset, and liver toxicity.  Patients who start dapsone require monitoring including baseline LFTs and weekly CBCs for the first month, then every month thereafter.  The patient verbalized understanding of the proper use and possible adverse effects of dapsone.  All of the patient's questions and concerns were addressed.
Erythromycin Counseling:  I discussed with the patient the risks of erythromycin including but not limited to GI upset, allergic reaction, drug rash, diarrhea, increase in liver enzymes, and yeast infections.
Tazorac Pregnancy And Lactation Text: This medication is not safe during pregnancy. It is unknown if this medication is excreted in breast milk.
Benzoyl Peroxide Counseling: Patient counseled that medicine may cause skin irritation and bleach clothing.  In the event of skin irritation, the patient was advised to reduce the amount of the drug applied or use it less frequently.   The patient verbalized understanding of the proper use and possible adverse effects of benzoyl peroxide.  All of the patient's questions and concerns were addressed.
Doxycycline Counseling:  Patient counseled regarding possible photosensitivity and increased risk for sunburn.  Patient instructed to avoid sunlight, if possible.  When exposed to sunlight, patients should wear protective clothing, sunglasses, and sunscreen.  The patient was instructed to call the office immediately if the following severe adverse effects occur:  hearing changes, easy bruising/bleeding, severe headache, or vision changes.  The patient verbalized understanding of the proper use and possible adverse effects of doxycycline.  All of the patient's questions and concerns were addressed.
High Dose Vitamin A Pregnancy And Lactation Text: High dose vitamin A therapy is contraindicated during pregnancy and breast feeding.
Topical Clindamycin Pregnancy And Lactation Text: This medication is Pregnancy Category B and is considered safe during pregnancy. It is unknown if it is excreted in breast milk.
Doxycycline Pregnancy And Lactation Text: This medication is Pregnancy Category D and not consider safe during pregnancy. It is also excreted in breast milk but is considered safe for shorter treatment courses.
Tetracycline Pregnancy And Lactation Text: This medication is Pregnancy Category D and not consider safe during pregnancy. It is also excreted in breast milk.
Minocycline Counseling: Patient advised regarding possible photosensitivity and discoloration of the teeth, skin, lips, tongue and gums.  Patient instructed to avoid sunlight, if possible.  When exposed to sunlight, patients should wear protective clothing, sunglasses, and sunscreen.  The patient was instructed to call the office immediately if the following severe adverse effects occur:  hearing changes, easy bruising/bleeding, severe headache, or vision changes.  The patient verbalized understanding of the proper use and possible adverse effects of minocycline.  All of the patient's questions and concerns were addressed.
High Dose Vitamin A Counseling: Side effects reviewed, pt to contact office should one occur.
Detail Level: Zone
Topical Sulfur Applications Pregnancy And Lactation Text: This medication is Pregnancy Category C and has an unknown safety profile during pregnancy. It is unknown if this topical medication is excreted in breast milk.
Dapsone Pregnancy And Lactation Text: This medication is Pregnancy Category C and is not considered safe during pregnancy or breast feeding.
Tazorac Counseling:  Patient advised that medication is irritating and drying.  Patient may need to apply sparingly and wash off after an hour before eventually leaving it on overnight.  The patient verbalized understanding of the proper use and possible adverse effects of tazorac.  All of the patient's questions and concerns were addressed.
Spironolactone Pregnancy And Lactation Text: This medication can cause feminization of the male fetus and should be avoided during pregnancy. The active metabolite is also found in breast milk.
Azithromycin Pregnancy And Lactation Text: This medication is considered safe during pregnancy and is also secreted in breast milk.
Topical Retinoid Pregnancy And Lactation Text: This medication is Pregnancy Category C. It is unknown if this medication is excreted in breast milk.
Tetracycline Counseling: Patient counseled regarding possible photosensitivity and increased risk for sunburn.  Patient instructed to avoid sunlight, if possible.  When exposed to sunlight, patients should wear protective clothing, sunglasses, and sunscreen.  The patient was instructed to call the office immediately if the following severe adverse effects occur:  hearing changes, easy bruising/bleeding, severe headache, or vision changes.  The patient verbalized understanding of the proper use and possible adverse effects of tetracycline.  All of the patient's questions and concerns were addressed. Patient understands to avoid pregnancy while on therapy due to potential birth defects.
Sarecycline Counseling: Patient advised regarding possible photosensitivity and discoloration of the teeth, skin, lips, tongue and gums.  Patient instructed to avoid sunlight, if possible.  When exposed to sunlight, patients should wear protective clothing, sunglasses, and sunscreen.  The patient was instructed to call the office immediately if the following severe adverse effects occur:  hearing changes, easy bruising/bleeding, severe headache, or vision changes.  The patient verbalized understanding of the proper use and possible adverse effects of sarecycline.  All of the patient's questions and concerns were addressed.

## 2021-04-20 ENCOUNTER — APPOINTMENT (RX ONLY)
Dept: URBAN - METROPOLITAN AREA CLINIC 77 | Facility: CLINIC | Age: 31
Setting detail: DERMATOLOGY
End: 2021-04-20

## 2021-04-20 DIAGNOSIS — Z79.899 OTHER LONG TERM (CURRENT) DRUG THERAPY: ICD-10-CM

## 2021-04-20 DIAGNOSIS — L70.0 ACNE VULGARIS: ICD-10-CM

## 2021-04-20 DIAGNOSIS — L85.3 XEROSIS CUTIS: ICD-10-CM

## 2021-04-20 PROCEDURE — ? COUNSELING

## 2021-04-20 PROCEDURE — ? PRESCRIPTION MEDICATION MANAGEMENT

## 2021-04-20 PROCEDURE — 99214 OFFICE O/P EST MOD 30 MIN: CPT

## 2021-04-20 PROCEDURE — ? HIGH RISK MEDICATION MONITORING

## 2021-04-20 ASSESSMENT — LOCATION ZONE DERM: LOCATION ZONE: FACE

## 2021-04-20 ASSESSMENT — LOCATION DETAILED DESCRIPTION DERM
LOCATION DETAILED: LEFT INFERIOR CENTRAL MALAR CHEEK
LOCATION DETAILED: LEFT CENTRAL MALAR CHEEK

## 2021-04-20 ASSESSMENT — LOCATION SIMPLE DESCRIPTION DERM: LOCATION SIMPLE: LEFT CHEEK

## 2021-04-20 NOTE — PROCEDURE: PRESCRIPTION MEDICATION MANAGEMENT
Plan: Location: Face \\nDuration: Finished 7th month going on 8th month \\nMethod of Contraception: OBC & MLC \\nPrescribed: no RX \\nPharmacy: [facility]\\niPledge: [phone]\\n\\n\\nFAILED: SPIRONOLACTONE 100mg, Plexion topical cleanser \\n\\n07/24/2020 - Absorica 40mg x 30 capsules \\n08/24/2020- Absorica 40mg x 60 capsules \\n09/28/2020- Absorica 40mg x 60 capsules -- 2 capsules on even days and 1 capsule on odd days \\n10/26/2020- Absorica 40mg x 60 capsules -- Now Start. with 2 capsules daily \\n12/14/2020- Absorica 40mg x 60 capsules -- 2 capsules on even days and 1 capsule on odd days \\n01/25/2021- Absorica 40mg take QD (Sent 60 capsules)\\n03/08/2021- Absorica 40mg take QD (NO RX SENT OUT)\\n04/20/2021-Absorica NO PRESCRIPTION WAS SENT OUT \\n\\n\\n04/20/2021\\n\\nPatient is here for a follow up on acne \\nShe has completed 7 months of Accutane and does not complain of any side effects other than dryness \\nShe has been taking oral Absorica as such 40MG day 1, 40mg day 2 and 80mg day 3 and then repeat. \\nShe has been doing current regimen due to elevated Triglycerides.\\nDiscussed labs with the patient and informed her labs are currently down and look like they are within normal range. \\nDiscussed with the patient she is to continue current treatment regimen and follow follow up in 3 months \\nDiscussed with the patient at this visit she would have been off the medication for about 2 months \\nDiscussed with the patient at follow up visit if all has gone as planned and no acne breakouts occur I will be prescribe her compounding medication to help prevent and control acne \\n---NO PRESCRIPTION WAS SENT OUT DUE TO SURPLUS THAT THE PATIENT HAS LEFT AT HOME- THIS SURPLUE IS DUE TO CURRENT TREATMENT REGIMEN ---\\n\\nDiscussed with patient side effects to be aware of: headaches, joint pain, dryness, nose bleeds, mood changes.\\nDuring therapy, avoid donating blood while on treatment.\\nUse sunscreen with zinc oxide to prevent sun damage and block UV light.\\nDiscussed with patient to take with a fatty meal in order to obtain the full absorption \\nDiscussed with patient that she will need to do her questions after i confirm her today and then she can call and request her medication at [facility] \\n\\nIPLEDGE program and consent form discussed with patient. The side effects and warnings for the use of Accutane were discussed with the patient. She understands she can not donate blood for 1 month post tx and can not have laser tx, cosmetic surgery, waxing or peels for 6 months post treatment.\\nDiscussed musculoskeletal SE’s in detail. Discussed HA’s, dry skin, and depression in detail. Patient denies depression. Denies personal or family H/O Crohns, IBS, IBD, or bloody stools.\\n\\n\\nPatient is to follow up in 3 months \\n-----------------------------------------------------\\nPatient is here for a follow up on her Accutane\\nShe states that her skin has cleared up since starting Accutane therapy.\\nShe doesn’t complain of any side effects aside from dryness and is taking accutane with a fatty meal\\nInstructed pt to take 1 pill(40mg) on first day, 1 pill (40mg) on 2nd day, then 2 pills(80mg) on the 3rd day, etc. etc.\\n\\nShe states she has 2 boxes of isotretinoin left at her house \\nDiscussed with patient that we will not send out prescription today since she has some left isotretinoin that she needs to finish out  \\n\\nReviewed blood work with patient informed her that her Triglycerides are back to normal since last visit\\nI will have patient repeat the blood work (REQ. form) was provided to the patient in the office\\nDiscussed with patient side effects to be aware of: headaches, joint pain, dryness, nose bleeds, mood changes.\\nDuring therapy, avoid donating blood while on treatment.\\nUse sunscreen with zinc oxide to prevent sun damage and block UV light.\\nDiscussed with patient to take with a fatty meal in order to obtain the full absorption \\nDiscussed with patient that she will need to do her questions after i confirm her today and then she can call and request her medication at [facility] \\n\\[employer] [facility] program and consent form discussed with patient. The side effects and warnings for the use of Accutane were discussed with the patient. She understands she can not donate blood for 1 month post tx and can not have laser tx, cosmetic surgery, waxing or peels for 6 months post treatment.\\nDiscussed musculoskeletal SE’s in detail. Discussed HA’s, dry skin, and depression in detail. Patient denies depression. Denies personal or family H/O Crohns, IBS, IBD, or bloody stools.\\nUnderstand the need to fill Rx in 6 days and have monthly labs and OV.\\n\\nFollow up: 6 weeks.
Detail Level: Zone
Render In Strict Bullet Format?: No

## 2021-07-20 ENCOUNTER — APPOINTMENT (RX ONLY)
Dept: URBAN - METROPOLITAN AREA CLINIC 77 | Facility: CLINIC | Age: 31
Setting detail: DERMATOLOGY
End: 2021-07-20

## 2021-07-20 DIAGNOSIS — L81.4 OTHER MELANIN HYPERPIGMENTATION: ICD-10-CM

## 2021-07-20 DIAGNOSIS — L85.3 XEROSIS CUTIS: ICD-10-CM

## 2021-07-20 DIAGNOSIS — L70.0 ACNE VULGARIS: ICD-10-CM

## 2021-07-20 PROCEDURE — ? COUNSELING

## 2021-07-20 PROCEDURE — ? PRESCRIPTION

## 2021-07-20 PROCEDURE — 99214 OFFICE O/P EST MOD 30 MIN: CPT

## 2021-07-20 PROCEDURE — ? TREATMENT REGIMEN

## 2021-07-20 PROCEDURE — ? PRESCRIPTION MEDICATION MANAGEMENT

## 2021-07-20 RX ORDER — PHARMACY COMPOUNDING ACCESSORY
EACH MISCELLANEOUS
Qty: 1 | Refills: 3 | Status: ERX | COMMUNITY
Start: 2021-07-20

## 2021-07-20 RX ADMIN — Medication: at 00:00

## 2021-07-20 ASSESSMENT — LOCATION SIMPLE DESCRIPTION DERM: LOCATION SIMPLE: LEFT CHEEK

## 2021-07-20 ASSESSMENT — LOCATION DETAILED DESCRIPTION DERM
LOCATION DETAILED: LEFT CENTRAL MALAR CHEEK
LOCATION DETAILED: LEFT INFERIOR CENTRAL MALAR CHEEK

## 2021-07-20 ASSESSMENT — LOCATION ZONE DERM: LOCATION ZONE: FACE

## 2021-07-20 NOTE — PROCEDURE: PRESCRIPTION MEDICATION MANAGEMENT
Plan: Location: Face \\nDuration: Finished 8 months of Accutane \\nMethod of Contraception: OBC & MLC \\nPrescribed: Hq4%, Tretinoin 0.05%, Kojic acid, Hydrocortisone 1%\\nPharmacy: [facility]\\niPledge: [phone]\\n\\n07/24/2020 - Absorica 40mg x 30 capsules \\n08/24/2020- Absorica 40mg x 60 capsules \\n09/28/2020- Absorica 40mg x 60 capsules -- 2 capsules on even days and 1 capsule on odd days \\n10/26/2020- Absorica 40mg x 60 capsules -- Now Start. with 2 capsules daily \\n12/14/2020- Absorica 40mg x 60 capsules -- 2 capsules on even days and 1 capsule on odd days \\n01/25/2021- Absorica 40mg take QD (Sent 60 capsules)\\n03/08/2021- Absorica 40mg take QD (NO RX SENT OUT)\\n04/20/2021-Absorica NO PRESCRIPTION WAS SENT OUT \\n\\nPhotos taken\\nFollow up\\nLOV 04/20/2021\\n\\nPatient is here for a follow up on acne \\nShe has completed 7 months of Accutane back in April of 2021\\nShe states that she has not had any breakouts since being off the Accutane\\nShe states she only washes with an OTC face wash while showering \\nShe uses Cerave Moisturizing Cream on face daily\\nShe comes today for further evaluation and continuation of treatment.\\n\\nDiscussed with pt:\\nEvaluating her skin today and comparing photos, there is no acne activity.\\nToday's plan:\\nShe may continue using the OTC face cleanser and Cerave Moisturizing Cream\\nI will prescribe her a compound cream that has HQ 4 %, Tretinoin 0.05%, Kojic Acid 3%, Hydrocortisone 1% \\nShe will apply this cream to her face nightly; a pea size amount and wash off in the AM\\nShe is still to continue using a good zinc sunscreen daily during the peak of the summer.\\nPt voiced understanding.\\n\\nFollow up 1 year.\\n---------------------------------------------------------------------\\nShe has been taking oral Absorica as such 40MG day 1, 40mg day 2 and 80mg day 3 and then repeat. \\nShe has been doing current regimen due to elevated Triglycerides.\\nDiscussed labs with the patient and informed her labs are currently down and look like they are within normal range. \\nDiscussed with the patient she is to continue current treatment regimen and follow follow up in 3 months \\nDiscussed with the patient at this visit she would have been off the medication for about 2 months \\nDiscussed with the patient at follow up visit if all has gone as planned and no acne breakouts occur I will be prescribe her compounding medication to help prevent and control acne \\n---NO PRESCRIPTION WAS SENT OUT DUE TO SURPLUS THAT THE PATIENT HAS LEFT AT HOME- THIS SURPLUE IS DUE TO CURRENT TREATMENT REGIMEN ---\\n\\nDiscussed with patient side effects to be aware of: headaches, joint pain, dryness, nose bleeds, mood changes.\\nDuring therapy, avoid donating blood while on treatment.\\nUse sunscreen with zinc oxide to prevent sun damage and block UV light.\\nDiscussed with patient to take with a fatty meal in order to obtain the full absorption \\nDiscussed with patient that she will need to do her questions after i confirm her today and then she can call and request her medication at [facility] \\n\\[employer] program and consent form discussed with patient. The side effects and warnings for the use of Accutane were discussed with the patient. She understands she can not donate blood for 1 month post tx and can not have laser tx, cosmetic surgery, waxing or peels for 6 months post treatment.\\nDiscussed musculoskeletal SE’s in detail. Discussed HA’s, dry skin, and depression in detail. Patient denies depression. Denies personal or family H/O Crohns, IBS, IBD, or bloody stools.\\n\\n\\nPatient is to follow up in 3 months \\n-----------------------------------------------------\\nPatient is here for a follow up on her Accutane\\nShe states that her skin has cleared up since starting Accutane therapy.\\nShe doesn’t complain of any side effects aside from dryness and is taking accutane with a fatty meal\\nInstructed pt to take 1 pill(40mg) on first day, 1 pill (40mg) on 2nd day, then 2 pills(80mg) on the 3rd day, etc. etc.\\n\\nShe states she has 2 boxes of isotretinoin left at her house \\nDiscussed with patient that we will not send out prescription today since she has some left isotretinoin that she needs to finish out  \\n\\nReviewed blood work with patient informed her that her Triglycerides are back to normal since last visit\\nI will have patient repeat the blood work (REQ. form) was provided to the patient in the office\\nDiscussed with patient side effects to be aware of: headaches, joint pain, dryness, nose bleeds, mood changes.\\nDuring therapy, avoid donating blood while on treatment.\\nUse sunscreen with zinc oxide to prevent sun damage and block UV light.\\nDiscussed with patient to take with a fatty meal in order to obtain the full absorption \\nDiscussed with patient that she will need to do her questions after i confirm her today and then she can call and request her medication at [facility] \\n\\nIPLEDGE program and consent form discussed with patient. The side effects and warnings for the use of Accutane were discussed with the patient. She understands she can not donate blood for 1 month post tx and can not have laser tx, cosmetic surgery, waxing or peels for 6 months post treatment.\\nDiscussed musculoskeletal SE’s in detail. Discussed HA’s, dry skin, and depression in detail. Patient denies depression. Denies personal or family H/O Crohns, IBS, IBD, or bloody stools.\\nUnderstand the need to fill Rx in 6 days and have monthly labs and OV.\\n\\nFollow up: 6 weeks.
Detail Level: Zone
Render In Strict Bullet Format?: No

## 2021-07-20 NOTE — PROCEDURE: TREATMENT REGIMEN
Plan: Location: face\\nPrescribed: HQ 4% Tretinoin 0.05% compound cream QHS\\n\\nPt has hyperpigmentation on face today.\\nDiscussed with pt that this pigmentation is due to sun damage to the skin, advised patient to use SPF daily.\\nDiscussed with pt that we will start patient on HQ 4% Tretinoin 0.05% to use nightly.\\nDiscussed with pt to apply a pea size amount to face, pushing into pores. \\nDiscussed with pt to avoid areas around eyes, nasal crease, and around the lips since skin is thin and may get irritated easily.\\n\\nDiscussed with pt that it will come in a box that says keep refrigerated.\\nDiscussed with pt that they do not need to keep it refrigerated, but have to keep it in a dark place since sunlight may oxidize it.\\nDiscussed with pt that if skin becomes irritated while using cream, then pt can change frequency to every other night, or every other other night, etc.\\nDiscussed with pt that results are not immediate and may take up to a month to notice change.\\nAdvised pt to apply a moisturizing cream such as Cerave afterwards to help reestablish a healthy skin barrier.\\nF/u as needed.
Detail Level: Zone

## 2021-08-17 ENCOUNTER — APPOINTMENT (RX ONLY)
Dept: URBAN - METROPOLITAN AREA CLINIC 77 | Facility: CLINIC | Age: 31
Setting detail: DERMATOLOGY
End: 2021-08-17

## 2021-08-17 DIAGNOSIS — Z41.9 ENCOUNTER FOR PROCEDURE FOR PURPOSES OTHER THAN REMEDYING HEALTH STATE, UNSPECIFIED: ICD-10-CM

## 2021-08-17 PROCEDURE — ? MICRONEEDLING

## 2021-08-17 ASSESSMENT — LOCATION SIMPLE DESCRIPTION DERM
LOCATION SIMPLE: LEFT CHEEK
LOCATION SIMPLE: RIGHT CHEEK
LOCATION SIMPLE: NOSE
LOCATION SIMPLE: SUPERIOR FOREHEAD
LOCATION SIMPLE: CHIN

## 2021-08-17 ASSESSMENT — LOCATION DETAILED DESCRIPTION DERM
LOCATION DETAILED: LEFT CHIN
LOCATION DETAILED: NASAL TIP
LOCATION DETAILED: SUPERIOR MID FOREHEAD
LOCATION DETAILED: LEFT INFERIOR CENTRAL MALAR CHEEK
LOCATION DETAILED: RIGHT INFERIOR CENTRAL MALAR CHEEK

## 2021-08-17 ASSESSMENT — LOCATION ZONE DERM
LOCATION ZONE: FACE
LOCATION ZONE: NOSE

## 2021-08-17 NOTE — PROCEDURE: MICRONEEDLING
Infusions (Optional): hyaluronic acid
Detail Level: Zone
Depth In Mm: 0.1
Depth In Mm: 2.5
Depth In Mm: 1
Post-Care Instructions: After the procedure, take precautions agains sun exposure. Use Gentle Cleanser & Tensage products as recommended. Do not apply sunscreen for 12 hours after the procedure. Do not apply make-up for 12 hours after the procedure. Advised patient not to cleanse until tomorrow morning and no working out or sweating until the following day to prevent further skin irritation. Avoid alcohol based toners for 1-2 days. After 2-3 days patients can return to their regular skin regimen. Applied Skinfuse HA Glide post treatment. Gave patient samples of Gentle cleanser, Skinfuse HA Glide and Sunscreen for post care, as well as detailed post care instructions on how to take care of the treated area.
Price (Use Numbers Only, No Special Characters Or $): 250.00
Topical Anesthesia?: BLT ointment (benzocaine 20%, lidocaine 10%, tetracaine 4%)
Location #2: cheeks, lip and chin
Length Of Topical Anesthesia Application (Optional): 30 minutes
Location #1: forehead, nose, around eyes and neck
Consent: Written consent obtained, risks reviewed including but not limited to pain, scarring, infection and incomplete improvement.  Patient understands the procedure is cosmetic in nature and will require out of pocket payment.  Cleansed with cleanser, extractions performed and sanitized with alcohol swab.

## 2021-09-28 ENCOUNTER — APPOINTMENT (RX ONLY)
Dept: URBAN - METROPOLITAN AREA CLINIC 77 | Facility: CLINIC | Age: 31
Setting detail: DERMATOLOGY
End: 2021-09-28

## 2021-09-28 DIAGNOSIS — Z41.9 ENCOUNTER FOR PROCEDURE FOR PURPOSES OTHER THAN REMEDYING HEALTH STATE, UNSPECIFIED: ICD-10-CM

## 2021-09-28 PROCEDURE — ? MICRONEEDLING

## 2021-09-28 ASSESSMENT — LOCATION DETAILED DESCRIPTION DERM
LOCATION DETAILED: NASAL TIP
LOCATION DETAILED: LEFT MEDIAL FOREHEAD
LOCATION DETAILED: LEFT CHIN
LOCATION DETAILED: LEFT INFERIOR CENTRAL MALAR CHEEK
LOCATION DETAILED: RIGHT INFERIOR CENTRAL MALAR CHEEK

## 2021-09-28 ASSESSMENT — LOCATION SIMPLE DESCRIPTION DERM
LOCATION SIMPLE: LEFT CHEEK
LOCATION SIMPLE: CHIN
LOCATION SIMPLE: NOSE
LOCATION SIMPLE: RIGHT CHEEK
LOCATION SIMPLE: LEFT FOREHEAD

## 2021-09-28 ASSESSMENT — LOCATION ZONE DERM
LOCATION ZONE: NOSE
LOCATION ZONE: FACE

## 2021-09-28 NOTE — PROCEDURE: MICRONEEDLING
Location #2: cheeks, lip and chin
Location #1: forehead, nose, around eyes and nose
Depth In Mm: 0.1
Price (Use Numbers Only, No Special Characters Or $): 250.00
Treatment Number (Optional): 2
Post-Care Instructions: After the procedure, take precautions agains sun exposure. Use Gentle Cleanser & Tensage products as recommended. Do not apply sunscreen for 12 hours after the procedure. Do not apply make-up for 12 hours after the procedure. Advised patient not to cleanse until tomorrow morning and no working out or sweating until the following day to prevent further skin irritation. Avoid alcohol based toners for 1-2 days. After 2-3 days patients can return to their regular skin regimen. Applied Skinfuse HA Glide post treatment. Gave patient samples of Gentle cleanser, Skinfuse HA Glide and Sunscreen for post care, as well as detailed post care instructions on how to take care of the treated area.
Depth In Mm: 2.5
Length Of Topical Anesthesia Application (Optional): 40 minutes
Infusions (Optional): hyaluronic acid
Depth In Mm: 1
Detail Level: Zone
Consent: Written consent obtained, risks reviewed including but not limited to pain, scarring, infection and incomplete improvement.  Patient understands the procedure is cosmetic in nature and will require out of pocket payment.  Cleansed with cleanser, extractions performed and sanitized with alcohol swab.
Topical Anesthesia?: BLT ointment (benzocaine 20%, lidocaine 10%, tetracaine 4%)

## 2021-11-02 ENCOUNTER — APPOINTMENT (RX ONLY)
Dept: URBAN - METROPOLITAN AREA CLINIC 77 | Facility: CLINIC | Age: 31
Setting detail: DERMATOLOGY
End: 2021-11-02

## 2021-11-02 DIAGNOSIS — Z41.9 ENCOUNTER FOR PROCEDURE FOR PURPOSES OTHER THAN REMEDYING HEALTH STATE, UNSPECIFIED: ICD-10-CM

## 2021-11-02 PROCEDURE — ? MICRONEEDLING

## 2021-11-02 ASSESSMENT — LOCATION DETAILED DESCRIPTION DERM
LOCATION DETAILED: NASAL TIP
LOCATION DETAILED: LEFT INFERIOR MEDIAL FOREHEAD
LOCATION DETAILED: LEFT LOWER CUTANEOUS LIP
LOCATION DETAILED: LEFT INFERIOR CENTRAL MALAR CHEEK
LOCATION DETAILED: RIGHT INFERIOR CENTRAL MALAR CHEEK

## 2021-11-02 ASSESSMENT — LOCATION SIMPLE DESCRIPTION DERM
LOCATION SIMPLE: RIGHT CHEEK
LOCATION SIMPLE: LEFT LIP
LOCATION SIMPLE: LEFT CHEEK
LOCATION SIMPLE: NOSE
LOCATION SIMPLE: LEFT FOREHEAD

## 2021-11-02 ASSESSMENT — LOCATION ZONE DERM
LOCATION ZONE: LIP
LOCATION ZONE: NOSE
LOCATION ZONE: FACE

## 2021-11-02 NOTE — PROCEDURE: MICRONEEDLING
Post-Care Instructions: After the procedure, take precautions agains sun exposure. Use Gentle Cleanser & Tensage products as recommended. Do not apply sunscreen for 12 hours after the procedure. Do not apply make-up for 12 hours after the procedure. Advised patient not to cleanse until tomorrow morning and no working out or sweating until the following day to prevent further skin irritation. Avoid alcohol based toners for 1-2 days. After 2-3 days patients can return to their regular skin regimen. Applied Skinfuse HA Glide post treatment. Gave patient samples of Gentle cleanser, Skinfuse HA Glide and Sunscreen for post care, as well as detailed post care instructions on how to take care of the treated area.
Depth In Mm: 2.5
Length Of Topical Anesthesia Application (Optional): 30 minutes
Price (Use Numbers Only, No Special Characters Or $): 250.00
Treatment Number (Optional): 0
Location #1: forehead, around eyes and nose
Detail Level: Zone
Depth In Mm: 0.1
Consent: Written consent obtained, risks reviewed including but not limited to pain, scarring, infection and incomplete improvement.  Patient understands the procedure is cosmetic in nature and will require out of pocket payment.  Cleansed with cleanser, extractions performed and sanitized with alcohol swab.
Location #2: cheeks, lip and chin
Topical Anesthesia?: BLT cream (benzocaine 20%, lidocaine 10%, tetracaine 10%)
Infusions (Optional): hyaluronic acid
Depth In Mm: 1